# Patient Record
Sex: FEMALE | Race: BLACK OR AFRICAN AMERICAN | NOT HISPANIC OR LATINO | URBAN - METROPOLITAN AREA
[De-identification: names, ages, dates, MRNs, and addresses within clinical notes are randomized per-mention and may not be internally consistent; named-entity substitution may affect disease eponyms.]

---

## 2022-08-05 ENCOUNTER — APPOINTMENT (OUTPATIENT)
Dept: URGENT CARE | Age: 87
End: 2022-08-05

## 2023-06-14 PROBLEM — R79.89 SERUM CREATININE RAISED: Status: ACTIVE | Noted: 2021-11-11

## 2023-06-14 PROBLEM — F43.9 STRESS AT HOME: Status: ACTIVE | Noted: 2019-01-31

## 2023-06-14 PROBLEM — R06.83 SNORES: Status: ACTIVE | Noted: 2019-01-31

## 2023-06-14 PROBLEM — Z86.010 HISTORY OF ADENOMATOUS POLYP OF COLON: Status: ACTIVE | Noted: 2019-04-25

## 2023-06-14 PROBLEM — R79.89 ELEVATED PLATELET COUNT: Status: ACTIVE | Noted: 2021-11-11

## 2023-06-14 PROBLEM — R60.0 BILATERAL LOWER EXTREMITY EDEMA: Status: ACTIVE | Noted: 2019-01-31

## 2023-06-14 PROBLEM — Z86.010 HISTORY OF ADENOMATOUS POLYP OF COLON: Status: RESOLVED | Noted: 2019-04-25 | Resolved: 2023-06-14

## 2023-08-23 PROBLEM — I10 HYPERTENSION: Status: ACTIVE | Noted: 2023-08-23

## 2023-09-26 PROBLEM — N32.81 OAB (OVERACTIVE BLADDER): Status: ACTIVE | Noted: 2023-09-26

## 2024-06-10 ENCOUNTER — OFFICE VISIT (OUTPATIENT)
Dept: INTERNAL MEDICINE CLINIC | Facility: CLINIC | Age: 89
End: 2024-06-10

## 2024-06-10 VITALS
HEART RATE: 71 BPM | BODY MASS INDEX: 33.23 KG/M2 | OXYGEN SATURATION: 95 % | DIASTOLIC BLOOD PRESSURE: 71 MMHG | SYSTOLIC BLOOD PRESSURE: 132 MMHG | WEIGHT: 176 LBS | HEIGHT: 61 IN | TEMPERATURE: 98 F

## 2024-06-10 DIAGNOSIS — K21.00 GASTROESOPHAGEAL REFLUX DISEASE WITH ESOPHAGITIS WITHOUT HEMORRHAGE: ICD-10-CM

## 2024-06-10 DIAGNOSIS — I83.893 VARICOSE VEINS OF LEG WITH SWELLING, BILATERAL: Primary | ICD-10-CM

## 2024-06-10 DIAGNOSIS — I87.2 STASIS DERMATITIS: ICD-10-CM

## 2024-06-10 PROBLEM — R79.89 ELEVATED PLATELET COUNT: Status: ACTIVE | Noted: 2021-11-11

## 2024-06-10 PROBLEM — R43.8 BITTER TASTE: Status: ACTIVE | Noted: 2024-02-01

## 2024-06-10 PROBLEM — R06.83 SNORES: Status: ACTIVE | Noted: 2019-01-31

## 2024-06-10 PROBLEM — F43.9 STRESS AT HOME: Status: ACTIVE | Noted: 2019-01-31

## 2024-06-10 PROBLEM — R60.0 BILATERAL LOWER EXTREMITY EDEMA: Status: ACTIVE | Noted: 2019-01-31

## 2024-06-10 PROBLEM — N39.41 URGE INCONTINENCE: Status: ACTIVE | Noted: 2018-03-03

## 2024-06-10 PROBLEM — I10 HYPERTENSION: Status: ACTIVE | Noted: 2023-08-23

## 2024-06-10 PROBLEM — R79.89 SERUM CREATININE RAISED: Status: ACTIVE | Noted: 2021-11-11

## 2024-06-10 PROBLEM — K14.3 BROWN HAIRY TONGUE: Status: ACTIVE | Noted: 2024-02-01

## 2024-06-10 PROBLEM — N32.81 OAB (OVERACTIVE BLADDER): Status: ACTIVE | Noted: 2023-09-26

## 2024-06-10 PROCEDURE — 99204 OFFICE O/P NEW MOD 45 MIN: CPT | Performed by: INTERNAL MEDICINE

## 2024-06-10 RX ORDER — FUROSEMIDE 40 MG/1
20 TABLET ORAL DAILY
COMMUNITY
Start: 2023-05-26

## 2024-06-10 RX ORDER — ASPIRIN 81 MG/1
81 TABLET ORAL DAILY
COMMUNITY

## 2024-06-10 NOTE — PROGRESS NOTES
Subjective:   Patient ID: Ivis Pablo is a 88 year old female.    Swelling        History/Other: She came in today to establish care with new physician.  According to the patient she does have chronic edema on both legs and she is using compression socks her left is more than her right.  According to her she does have varicose veins and previous surgery the swelling progressively is getting worse.  She is on diuretics.  But is not helping she wants to see a vein specialist.    She also states that she does have darkening of the skin around the ankles wants to see a dermatologist  According to her she also has a very bitter taste in her mouth.  This is ongoing for a long time and she was seen by ENT at  [place] and according to her she was told that there is due to her age  Review of Systems   Constitutional: Negative.    HENT: Negative.     Respiratory: Negative.     Cardiovascular:  Positive for leg swelling.   Genitourinary: Negative.    Musculoskeletal: Negative.    Skin: Negative.    Neurological: Negative.    Hematological: Negative.    Psychiatric/Behavioral: Negative.       Current Outpatient Medications   Medication Sig Dispense Refill    furosemide 40 MG Oral Tab Take 0.5 tablets (20 mg total) by mouth daily.      aspirin 81 MG Oral Tab EC Take 1 tablet (81 mg total) by mouth daily.      naphazoline-pheniramine 0.025-0.3 % Ophthalmic Solution Apply 1 Application to eye As Directed.       Allergies:  Allergies   Allergen Reactions    Penicillins HIVES, ITCHING, OTHER (SEE COMMENTS) and RASH     itching in abdominal area    itching in abdominal area   itching in abdominal area      Other reaction(s): PRURITUS   itching in abdominal area    Erythromycin RASH       Objective:   Physical Exam  Constitutional:       Appearance: Normal appearance.   HENT:      Head: Normocephalic and atraumatic.   Cardiovascular:      Rate and Rhythm: Normal rate and regular rhythm.      Heart sounds: Normal heart sounds.    Pulmonary:      Effort: Pulmonary effort is normal.      Breath sounds: Normal breath sounds.   Abdominal:      Palpations: Abdomen is soft.   Musculoskeletal:         General: Swelling present.      Cervical back: Normal range of motion and neck supple.      Comments: 1+ edema   Skin:     General: Skin is warm.   Neurological:      Mental Status: She is alert. Mental status is at baseline.         Assessment & Plan:   1.   Bad mouth taste  check for H. pylori   2. Varicose veins of leg with swelling, bilateral I will order ultrasound venous insufficiency, continue with compression socks I will refer to see interventional radiologist   3. Stasis dermatitis referral for dermatology       Orders Placed This Encounter   Procedures    Helicobacter Pylori Breath Test, Adult       Meds This Visit:  Requested Prescriptions      No prescriptions requested or ordered in this encounter       Imaging & Referrals:  DERM - INTERNAL  OP REFERRAL TO INTERVENTIONAL RADIOLOGY  US VENOUS INSUFFICIENCY (REFLUX) BILAT LOWER EXT SH(CPT=93970)

## 2024-06-26 ENCOUNTER — OFFICE VISIT (OUTPATIENT)
Dept: INTERNAL MEDICINE CLINIC | Facility: CLINIC | Age: 89
End: 2024-06-26

## 2024-06-26 VITALS
SYSTOLIC BLOOD PRESSURE: 135 MMHG | WEIGHT: 177 LBS | DIASTOLIC BLOOD PRESSURE: 85 MMHG | OXYGEN SATURATION: 95 % | HEART RATE: 71 BPM | HEIGHT: 61 IN | BODY MASS INDEX: 33.42 KG/M2 | TEMPERATURE: 98 F

## 2024-06-26 DIAGNOSIS — K13.79 MOUTH SORES: Primary | ICD-10-CM

## 2024-06-26 PROCEDURE — 99213 OFFICE O/P EST LOW 20 MIN: CPT | Performed by: INTERNAL MEDICINE

## 2024-06-26 NOTE — PROGRESS NOTES
Subjective:     Patient ID: Ivis Pablo is a 88 year old female.    Cough        History/Other:   Today for follow-up from emergency room.  According to the patient she went to emergency room because she was having swelling on her lips her mouth was sore she was not able to eat anything.  In the ER they checked for strep throat was negative they did blood work and she could not wait she left.  She is here at the office with the same complaint she.  She has chronic coating of her tongue and pain.  She was seen by ENT 6 months ago      Review of Systems   Constitutional: Negative.    HENT: Negative.     Respiratory:  Negative for cough.    Cardiovascular: Negative.    Gastrointestinal: Negative.    Genitourinary: Negative.    Neurological: Negative.    Hematological: Negative.    Psychiatric/Behavioral: Negative.       Current Outpatient Medications   Medication Sig Dispense Refill    aspirin 81 MG Oral Tab EC Take 1 tablet (81 mg total) by mouth daily.      furosemide 40 MG Oral Tab Take 0.5 tablets (20 mg total) by mouth daily.      naphazoline-pheniramine 0.025-0.3 % Ophthalmic Solution Apply 1 Application to eye As Directed.       Allergies:  Allergies   Allergen Reactions    Penicillins HIVES, ITCHING, OTHER (SEE COMMENTS) and RASH     itching in abdominal area    itching in abdominal area   itching in abdominal area      Other reaction(s): PRURITUS   itching in abdominal area    Erythromycin RASH       Past Medical History:    Edema    Uterine cancer (HCC)      Past Surgical History:   Procedure Laterality Date    Cataract      Hysterectomy      Other surgical history  02/2024    botox procedure of bladder      Family History   Problem Relation Age of Onset    Other (tuberculosis) Father     Hypertension Mother     Alcohol abuse Mother     Hypertension Sister     Heart Attack Sister       Social History:   Social History     Socioeconomic History    Marital status: Unknown   Tobacco Use    Smoking status: Never      Passive exposure: Never    Smokeless tobacco: Never   Vaping Use    Vaping status: Never Used   Substance and Sexual Activity    Alcohol use: Not Currently    Drug use: Not Currently        Objective:   Physical Exam  Vitals and nursing note reviewed.   Constitutional:       Appearance: Normal appearance.   HENT:      Head: Normocephalic and atraumatic.      Comments: Coated tongue     Mouth/Throat:      Dentition: Gingival swelling present.   Cardiovascular:      Rate and Rhythm: Normal rate and regular rhythm.      Pulses: Normal pulses.      Heart sounds: Normal heart sounds.   Musculoskeletal:      Cervical back: Normal range of motion and neck supple.   Neurological:      Mental Status: She is alert.         Assessment & Plan:   No diagnosis found.  Oral mucosa lesion/swelling/I will prescribe her Magic wash and I did refer her to see ENT as soon as possible, if she is not able to eat and drink she needs to go to ER  No orders of the defined types were placed in this encounter.      Meds This Visit:  Requested Prescriptions      No prescriptions requested or ordered in this encounter       Imaging & Referrals:  None

## 2024-06-27 ENCOUNTER — TELEPHONE (OUTPATIENT)
Dept: INTERNAL MEDICINE CLINIC | Facility: CLINIC | Age: 89
End: 2024-06-27

## 2024-06-27 ENCOUNTER — OFFICE VISIT (OUTPATIENT)
Dept: OTOLARYNGOLOGY | Facility: CLINIC | Age: 89
End: 2024-06-27

## 2024-06-27 DIAGNOSIS — K13.70 ORAL MUCOSAL LESION: Primary | ICD-10-CM

## 2024-06-27 PROCEDURE — 99203 OFFICE O/P NEW LOW 30 MIN: CPT | Performed by: OTOLARYNGOLOGY

## 2024-06-27 RX ORDER — SULFAMETHOXAZOLE AND TRIMETHOPRIM 800; 160 MG/1; MG/1
1 TABLET ORAL 2 TIMES DAILY
COMMUNITY
Start: 2024-04-24

## 2024-06-27 RX ORDER — SOLIFENACIN SUCCINATE 10 MG/1
10 TABLET, FILM COATED ORAL DAILY
COMMUNITY
Start: 2024-03-13

## 2024-06-27 RX ORDER — SULFAMETHOXAZOLE AND TRIMETHOPRIM 800; 160 MG/1; MG/1
1 TABLET ORAL EVERY 12 HOURS
Qty: 14 TABLET | Refills: 0 | Status: SHIPPED | OUTPATIENT
Start: 2024-06-27

## 2024-06-27 RX ORDER — FESOTERODINE FUMARATE 4 MG/1
4 TABLET, FILM COATED, EXTENDED RELEASE ORAL DAILY
COMMUNITY

## 2024-06-27 RX ORDER — TOLTERODINE 4 MG/1
4 CAPSULE, EXTENDED RELEASE ORAL DAILY
COMMUNITY
Start: 2023-05-26

## 2024-06-27 RX ORDER — DIPHENHYDRAMINE HCL 12.5MG/5ML
LIQUID (ML) ORAL
Qty: 500 ML | Refills: 0 | Status: SHIPPED | OUTPATIENT
Start: 2024-06-27

## 2024-06-27 RX ORDER — OMEPRAZOLE 20 MG/1
CAPSULE, DELAYED RELEASE ORAL
COMMUNITY
Start: 2024-02-01

## 2024-06-27 RX ORDER — POTASSIUM CHLORIDE 750 MG/1
20 TABLET, FILM COATED, EXTENDED RELEASE ORAL DAILY
COMMUNITY
Start: 2022-06-06

## 2024-06-27 RX ORDER — PREDNISONE 10 MG/1
TABLET ORAL
Qty: 44 TABLET | Refills: 0 | Status: SHIPPED | OUTPATIENT
Start: 2024-06-27

## 2024-06-27 NOTE — PROGRESS NOTES
Ivis Pablo is a 88 year old female.    Chief Complaint   Patient presents with    Mouth/Lip Problem     Patient is here due to sores in mouth and discoloration in tongue  x 1 week         HISTORY OF PRESENT ILLNESS  States that she was seen by Dr. Dominguez 6 months ago at [facility] and diagnosed with oral thrush but given Biotene mouthwash to use with no medications.  Seen by another doctor in February at [facility] and told that she had thrush and started on an antithrush medication.  No improvement her symptoms.  Symptoms are wax and wane and more recently she has been having severe throat discomfort to the point where she has not been able to actually eat food or liquids very readily since 3 days.  Seen by Dr. Pacheco and sent to my office for my opinion regarding her chronic mouth discomfort.  She states that her mouth is full of ulcers and this is much worse than usual.  Prescribed some type of medication by her physician which was not available to her.  On no other meds at this time.  No other signs, symptoms or complaints      Social History     Socioeconomic History    Marital status: Unknown   Tobacco Use    Smoking status: Never     Passive exposure: Never    Smokeless tobacco: Never   Vaping Use    Vaping status: Never Used   Substance and Sexual Activity    Alcohol use: Not Currently    Drug use: Not Currently       Family History   Problem Relation Age of Onset    Other (tuberculosis) Father     Hypertension Mother     Alcohol abuse Mother     Hypertension Sister     Heart Attack Sister        Past Medical History:    Edema    Uterine cancer (HCC)       Past Surgical History:   Procedure Laterality Date    Cataract      Hysterectomy      Other surgical history  02/2024    botox procedure of bladder         REVIEW OF SYSTEMS    System Neg/Pos Details   Constitutional Negative Fatigue, fever and weight loss.   ENMT Negative Drooling.   Eyes Negative Blurred vision and vision  changes.   Respiratory Negative Dyspnea and wheezing.   Cardio Negative Chest pain, irregular heartbeat/palpitations and syncope.   GI Negative Abdominal pain and diarrhea.   Endocrine Negative Cold intolerance and heat intolerance.   Neuro Negative Tremors.   Psych Negative Anxiety and depression.   Integumentary Negative Frequent skin infections, pigment change and rash.   Hema/Lymph Negative Easy bleeding and easy bruising.           PHYSICAL EXAM    There were no vitals taken for this visit.       Constitutional Normal Overall appearance - Normal.   Psychiatric Normal Orientation - Oriented to time, place, person & situation. Appropriate mood and affect.   Neck Exam Normal Inspection - Normal. Palpation - Normal. Parotid gland - Normal. Thyroid gland - Normal.   Eyes Normal Conjunctiva - Right: Normal, Left: Normal. Pupil - Right: Normal, Left: Normal. Fundus - Right: Normal, Left: Normal.   Neurological Normal Memory - Normal. Cranial nerves - Cranial nerves II through XII grossly intact.   Head/Face Normal Facial features - Normal. Eyebrows - Normal. Skull - Normal.        Nasopharynx Normal External nose - Normal. Lips/teeth/gums - Normal. Tonsils - Normal. Oropharynx - Normal.   Ears Normal Inspection - Right: Normal, Left: Normal. Canal - Right: Normal, Left: Normal. TM - Right: Normal, Left: Normal.   Skin Normal Inspection - Normal.        Lymph Detail Normal Submental. Submandibular. Anterior cervical. Posterior cervical. Supraclavicular.        Nose/Mouth/Throat Normal External nose - Normal. Lips/teeth/gums - Normal. Tonsils - Normal. Oropharynx -significant erythema of the entire buccal palatal mucosa including the labial mucosa.  Erythema of the lateral aspects of the tongue bilaterally and ulcers noted along the buccal mucosa.   Nose/Mouth/Throat Normal Nares - Right: Normal Left: Normal. Septum -Normal  Turbinates - Right: Normal, Left: Normal.       Current Outpatient Medications:     fesoterodine  ER 4 MG Oral Tablet 24 Hr, Take 1 tablet (4 mg total) by mouth daily., Disp: , Rfl:     omeprazole 20 MG Oral Capsule Delayed Release, , Disp: , Rfl:     Potassium Chloride ER 10 MEQ Oral Tab CR, Take 2 tablets (20 mEq total) by mouth daily., Disp: , Rfl:     Solifenacin Succinate 10 MG Oral Tab, Take 1 tablet (10 mg total) by mouth daily., Disp: , Rfl:     sulfamethoxazole-trimethoprim -160 MG Oral Tab per tablet, Take 1 tablet by mouth 2 (two) times daily., Disp: , Rfl:     tolterodine ER 4 MG Oral Capsule SR 24 Hr, Take 1 capsule (4 mg total) by mouth daily., Disp: , Rfl:     predniSONE 10 MG Oral Tab, 6 tablets daily day one through 5, 4 tablets daily on days  6 and 7, 2 tablets daily on days 8 and 9, One tablet daily on days 10 and 11., Disp: 44 tablet, Rfl: 0    diphenhydrAMINE 12.5 MG/5ML Oral Elixir, Please crush 8 tablets of hydrocortisone 20 mg and mix in 500 cc of Benadryl elixir. 5 cc swish and swallow q.6 hours, Disp: 500 mL, Rfl: 0    sulfamethoxazole-trimethoprim -160 MG Oral Tab per tablet, Take 1 tablet by mouth every 12 (twelve) hours., Disp: 14 tablet, Rfl: 0    benadryl-lidocaine-mylanta 1:1:1 Oral Suspension, Take 5-10 mL by mouth 3 (three) times daily before meals. Swish and Smallow or Swish and Spit, Disp: 100 mL, Rfl: 0    aspirin 81 MG Oral Tab EC, Take 1 tablet (81 mg total) by mouth daily., Disp: , Rfl:     furosemide 40 MG Oral Tab, Take 0.5 tablets (20 mg total) by mouth daily., Disp: , Rfl:     naphazoline-pheniramine 0.025-0.3 % Ophthalmic Solution, Apply 1 Application to eye As Directed., Disp: , Rfl:   ASSESSMENT AND PLAN    1. Oral mucosal lesion  Unclear which she may be seen at this time.  Unlikely to be thrush as she never had any resolution with previous treatments.  We discussed the possibility that this may be some type of pemphigus or pemphigoid type of process.  I did ask her to start a prednisone burst and taper a mouthwash containing Benadryl and hydrocortisone  as well as's for secondary mucosal infection from oral bacteria.  Return to see me in 1 week for reevaluation.  I did discuss with her the fact that this is serious enough that she has has not eaten in about 3 days and if she finds that she cannot take her medications or food or more importantly taken liquids that she should present to the ED for possible admission.  She states understanding and agrees with this plan.        This note was prepared using Dragon [employer] voice recognition dictation software. As a result errors may occur. When identified these errors have been corrected. While every attempt is made to correct errors during dictation discrepancies may still exist    Leon Figueredo MD    6/27/2024    11:00 AM

## 2024-06-27 NOTE — TELEPHONE ENCOUNTER
Per pharmacy rep they did receive 3 prescriptions yesterday, and the compounded prescription cannot be done there.  That prescription will need to go to [facility] pharmacy on [address] in [place] phone # [phone].    Message left for patient to call us back need to relate information to her and ask if she wants to  the compounded prescription at the other [facility] pharmacy.

## 2024-06-27 NOTE — TELEPHONE ENCOUNTER
Patient went to the pharmacy and was told the script was not received.     Disp Refills Start End     benadryl-lidocaine-mylanta 1:1:1 Oral Suspension 100 mL 0 6/26/2024 --    Sig - Route: Take 5-10 mL by mouth 3 (three) times daily before meals. Swish and Smallow or Swish and Spit - Oral      Pharmacy  [facility]/PHARMACY #[id number] [place], [place] - [address] [place] [address]. [phone], [phone]

## 2024-07-01 ENCOUNTER — HOSPITAL ENCOUNTER (OUTPATIENT)
Dept: ULTRASOUND IMAGING | Facility: HOSPITAL | Age: 89
Discharge: HOME | End: 2024-07-01
Attending: INTERNAL MEDICINE
Payer: COMMERCIAL

## 2024-07-01 DIAGNOSIS — I83.893 VARICOSE VEINS OF LEG WITH SWELLING, BILATERAL: ICD-10-CM

## 2024-07-01 PROCEDURE — 93970 EXTREMITY STUDY: CPT | Performed by: INTERNAL MEDICINE

## 2024-07-05 ENCOUNTER — OFFICE VISIT (OUTPATIENT)
Dept: OTOLARYNGOLOGY | Facility: CLINIC | Age: 89
End: 2024-07-05

## 2024-07-05 DIAGNOSIS — K13.70 ORAL MUCOSAL LESION: Primary | ICD-10-CM

## 2024-07-05 PROCEDURE — 99213 OFFICE O/P EST LOW 20 MIN: CPT | Performed by: OTOLARYNGOLOGY

## 2024-07-05 RX ORDER — DIPHENHYDRAMINE HCL 12.5MG/5ML
LIQUID (ML) ORAL
Qty: 500 ML | Refills: 0 | Status: SHIPPED | OUTPATIENT
Start: 2024-07-05

## 2024-07-05 NOTE — PROGRESS NOTES
Ivis Pablo is a 88 year old female.    Chief Complaint   Patient presents with    Follow - Up     Oral lesion reevaluation        HISTORY OF PRESENT ILLNESS  States that she was seen by Dr. Dominguez 6 months ago at [facility] and diagnosed with oral thrush but given Biotene mouthwash to use with no medications.  Seen by another doctor in February at [facility] and told that she had thrush and started on an antithrush medication.  No improvement her symptoms.  Symptoms are wax and wane and more recently she has been having severe throat discomfort to the point where she has not been able to actually eat food or liquids very readily since 3 days.  Seen by Dr. Pacheco and sent to my office for my opinion regarding her chronic mouth discomfort.  She states that her mouth is full of ulcers and this is much worse than usual.  Prescribed some type of medication by her physician which was not available to her.  On no other meds at this time.  No other signs, symptoms or complaints     7/5/24 last visit started on prednisone burst and taper as well as hydrocortisone/Benadryl elixir.  Was not dispensed the elixir by her [facility] pharmacy.  She states that within 3 days she was able to eat and feels very good at this point feeling that she is essentially back to normal.  No complaints or concerns even feels that her tongue has improved in terms of its appearance on the surface.  No other signs, symptoms or complaints.  Etiology of her problem unclear at this time.      Social History     Socioeconomic History    Marital status: Unknown   Tobacco Use    Smoking status: Never     Passive exposure: Never    Smokeless tobacco: Never   Vaping Use    Vaping status: Never Used   Substance and Sexual Activity    Alcohol use: Not Currently    Drug use: Not Currently       Family History   Problem Relation Age of Onset    Other (tuberculosis) Father     Hypertension Mother     Alcohol abuse Mother     Hypertension  Sister     Heart Attack Sister        Past Medical History:    Edema    Uterine cancer (HCC)       Past Surgical History:   Procedure Laterality Date    Cataract      Hysterectomy      Other surgical history  02/2024    botox procedure of bladder         REVIEW OF SYSTEMS    System Neg/Pos Details   Constitutional Negative Fatigue, fever and weight loss.   ENMT Negative Drooling.   Eyes Negative Blurred vision and vision changes.   Respiratory Negative Dyspnea and wheezing.   Cardio Negative Chest pain, irregular heartbeat/palpitations and syncope.   GI Negative Abdominal pain and diarrhea.   Endocrine Negative Cold intolerance and heat intolerance.   Neuro Negative Tremors.   Psych Negative Anxiety and depression.   Integumentary Negative Frequent skin infections, pigment change and rash.   Hema/Lymph Negative Easy bleeding and easy bruising.           PHYSICAL EXAM    There were no vitals taken for this visit.       Constitutional Normal Overall appearance - Normal.   Psychiatric Normal Orientation - Oriented to time, place, person & situation. Appropriate mood and affect.   Neck Exam Normal Inspection - Normal. Palpation - Normal. Parotid gland - Normal. Thyroid gland - Normal.   Eyes Normal Conjunctiva - Right: Normal, Left: Normal. Pupil - Right: Normal, Left: Normal. Fundus - Right: Normal, Left: Normal.   Neurological Normal Memory - Normal. Cranial nerves - Cranial nerves II through XII grossly intact.   Head/Face Normal Facial features - Normal. Eyebrows - Normal. Skull - Normal.        Nasopharynx Normal External nose - Normal. Lips/teeth/gums - Normal. Tonsils - Normal. Oropharynx - Normal.   Ears Normal Inspection - Right: Normal, Left: Normal. Canal - Right: Normal, Left: Normal. TM - Right: Normal, Left: Normal.   Skin Normal Inspection - Normal.        Lymph Detail Normal Submental. Submandibular. Anterior cervical. Posterior cervical. Supraclavicular.        Nose/Mouth/Throat Normal External nose -  Normal. Lips/teeth/gums - Normal. Tonsils - Normal. Oropharynx - Normal.   Nose/Mouth/Throat Normal Nares - Right: Normal Left: Normal. Septum -Normal  Turbinates - Right: Normal, Left: Normal.       Current Outpatient Medications:     diphenhydrAMINE 12.5 MG/5ML Oral Elixir, Please crush 8 tablets of hydrocortisone 20 mg and mix in 500 cc of Benadryl elixir. 5 cc swish and swallow q.6 hours, Disp: 500 mL, Rfl: 0    fesoterodine ER 4 MG Oral Tablet 24 Hr, Take 1 tablet (4 mg total) by mouth daily., Disp: , Rfl:     omeprazole 20 MG Oral Capsule Delayed Release, , Disp: , Rfl:     Potassium Chloride ER 10 MEQ Oral Tab CR, Take 2 tablets (20 mEq total) by mouth daily., Disp: , Rfl:     Solifenacin Succinate 10 MG Oral Tab, Take 1 tablet (10 mg total) by mouth daily., Disp: , Rfl:     sulfamethoxazole-trimethoprim -160 MG Oral Tab per tablet, Take 1 tablet by mouth 2 (two) times daily., Disp: , Rfl:     tolterodine ER 4 MG Oral Capsule SR 24 Hr, Take 1 capsule (4 mg total) by mouth daily., Disp: , Rfl:     predniSONE 10 MG Oral Tab, 6 tablets daily day one through 5, 4 tablets daily on days  6 and 7, 2 tablets daily on days 8 and 9, One tablet daily on days 10 and 11., Disp: 44 tablet, Rfl: 0    sulfamethoxazole-trimethoprim -160 MG Oral Tab per tablet, Take 1 tablet by mouth every 12 (twelve) hours., Disp: 14 tablet, Rfl: 0    benadryl-lidocaine-mylanta 1:1:1 Oral Suspension, Take 5-10 mL by mouth 3 (three) times daily before meals. Swish and Smallow or Swish and Spit, Disp: 100 mL, Rfl: 0    aspirin 81 MG Oral Tab EC, Take 1 tablet (81 mg total) by mouth daily., Disp: , Rfl:     furosemide 40 MG Oral Tab, Take 0.5 tablets (20 mg total) by mouth daily., Disp: , Rfl:     naphazoline-pheniramine 0.025-0.3 % Ophthalmic Solution, Apply 1 Application to eye As Directed., Disp: , Rfl:   ASSESSMENT AND PLAN    1. Oral mucosal lesion  Complete resolution of her oral mucosal lesions.  Even some improvement in her  tongue surface.  I have asked her to look into getting the mouthwash containing Benadryl and hydrocortisone and to return to see me as needed but she is now eating and feeling essentially back to normal again.        This note was prepared using Dragon [employer] voice recognition dictation software. As a result errors may occur. When identified these errors have been corrected. While every attempt is made to correct errors during dictation discrepancies may still exist    Leon Figueredo MD    7/5/2024    11:07 AM

## 2024-07-08 DIAGNOSIS — M79.89 LEFT LEG SWELLING: Primary | ICD-10-CM

## 2024-07-09 ENCOUNTER — TELEPHONE (OUTPATIENT)
Dept: OTOLARYNGOLOGY | Facility: CLINIC | Age: 89
End: 2024-07-09

## 2024-07-09 NOTE — TELEPHONE ENCOUNTER
Dr. Figueredo, Patient does not feel comfortable crushing the hydrocortisone pills and putting them in the Benadryl elixir.  Pharmacy is asking for another alternative.  They are not a compounding pharmacy. She's been trying to get this med since 7/5/24. Please advise.

## 2024-07-09 NOTE — TELEPHONE ENCOUNTER
LVM for pharmacist, informing him/her that if the patient or caregiver feels competent enough to crush the pills and then add them to the Benadryl elixir, then patient can get the meds separately and do that.  If they feel they are not competent enough to do that, then the pharmacy needs to call our office and we will see if Dr. Figueredo has another option.

## 2024-07-09 NOTE — TELEPHONE ENCOUNTER
[facility]/pharm calling to speak with nurse, patient does not feel comfortable crushing her own pills. Please call a [phone],thanks.  *see closed telephone encounter from today

## 2024-07-09 NOTE — TELEPHONE ENCOUNTER
[facility] pharmacy calling because they do not compound at their store. They state they can not crush the 8 tablets of hydrocortisone and mix with the Benadryl. They are asking if they should give these medications to the patient separately, or if something else should be prescribed. She states patient has been trying to  medication since 7/5. Please call.       Current Outpatient Medications:     diphenhydrAMINE 12.5 MG/5ML Oral Elixir, Please crush 8 tablets of hydrocortisone 20 mg and mix in 500 cc of Benadryl elixir. 5 cc swish and swallow q.6 hours, Disp: 500 mL, Rfl: 0

## 2024-07-10 RX ORDER — DIPHENHYDRAMINE HCL 12.5MG/5ML
LIQUID (ML) ORAL
Qty: 500 ML | Refills: 0 | Status: SHIPPED | OUTPATIENT
Start: 2024-07-10

## 2024-07-10 NOTE — TELEPHONE ENCOUNTER
Patient is calling back to ask what type of alternative medication she should take. Please call.

## 2024-07-10 NOTE — TELEPHONE ENCOUNTER
[facility]  [facility]  [address], [place] [address] · [address]  [phone]  Rx sent to [facility] pharmacy and pt informed.  Confirmed [facility] pharmacy does compound medications.

## 2024-07-23 ENCOUNTER — OFFICE VISIT (OUTPATIENT)
Dept: OTOLARYNGOLOGY | Facility: CLINIC | Age: 89
End: 2024-07-23

## 2024-07-23 DIAGNOSIS — K13.70 ORAL MUCOSAL LESION: ICD-10-CM

## 2024-07-23 DIAGNOSIS — R13.10 DYSPHAGIA, UNSPECIFIED TYPE: Primary | ICD-10-CM

## 2024-07-23 PROCEDURE — 99213 OFFICE O/P EST LOW 20 MIN: CPT | Performed by: OTOLARYNGOLOGY

## 2024-07-23 PROCEDURE — 31575 DIAGNOSTIC LARYNGOSCOPY: CPT | Performed by: OTOLARYNGOLOGY

## 2024-07-23 NOTE — H&P (VIEW-ONLY)
Ivis Pablo is a 88 year old female.    Chief Complaint   Patient presents with    Follow - Up     Patient is here due to oral lesion follow up.     Throat Problem     Patient reports issues swallowing.        HISTORY OF PRESENT ILLNESS  States that she was seen by Dr. Dominguez 6 months ago at [facility] and diagnosed with oral thrush but given Biotene mouthwash to use with no medications.  Seen by another doctor in February at [facility] and told that she had thrush and started on an antithrush medication.  No improvement her symptoms.  Symptoms are wax and wane and more recently she has been having severe throat discomfort to the point where she has not been able to actually eat food or liquids very readily since 3 days.  Seen by Dr. Pacheco and sent to my office for my opinion regarding her chronic mouth discomfort.  She states that her mouth is full of ulcers and this is much worse than usual.  Prescribed some type of medication by her physician which was not available to her.  On no other meds at this time.  No other signs, symptoms or complaints     7/5/24 last visit started on prednisone burst and taper as well as hydrocortisone/Benadryl elixir.  Was not dispensed the elixir by her [facility] pharmacy.  She states that within 3 days she was able to eat and feels very good at this point feeling that she is essentially back to normal.  No complaints or concerns even feels that her tongue has improved in terms of its appearance on the surface.  No other signs, symptoms or complaints.  Etiology of her problem unclear at this time.     7/23/24 she has been using diphenhydramine/hydrocortisone for her oral lesions and feels that they have all resolved at this time.  She states that she was doing very well up until last week when she awoke and suddenly noted that she could not frantic and started trying to pass warm liquids which she states tends to open things up and allow her to swallow a little bit  better.  Has a hard time swallowing hard foods and even liquids seem to get hung up at times.  No other signs, symptoms or complaints she seems to be able to tolerate her own saliva.      Social History     Socioeconomic History    Marital status: Unknown   Tobacco Use    Smoking status: Never     Passive exposure: Never    Smokeless tobacco: Never   Vaping Use    Vaping status: Never Used   Substance and Sexual Activity    Alcohol use: Not Currently    Drug use: Not Currently       Family History   Problem Relation Age of Onset    Other (tuberculosis) Father     Hypertension Mother     Alcohol abuse Mother     Hypertension Sister     Heart Attack Sister        Past Medical History:    Edema    Uterine cancer (HCC)       Past Surgical History:   Procedure Laterality Date    Cataract      Hysterectomy      Other surgical history  02/2024    botox procedure of bladder         REVIEW OF SYSTEMS    System Neg/Pos Details   Constitutional Negative Fatigue, fever and weight loss.   ENMT Negative Drooling.   Eyes Negative Blurred vision and vision changes.   Respiratory Negative Dyspnea and wheezing.   Cardio Negative Chest pain, irregular heartbeat/palpitations and syncope.   GI Negative Abdominal pain and diarrhea.   Endocrine Negative Cold intolerance and heat intolerance.   Neuro Negative Tremors.   Psych Negative Anxiety and depression.   Integumentary Negative Frequent skin infections, pigment change and rash.   Hema/Lymph Negative Easy bleeding and easy bruising.           PHYSICAL EXAM    There were no vitals taken for this visit.       Constitutional Normal Overall appearance - Normal.   Psychiatric Normal Orientation - Oriented to time, place, person & situation. Appropriate mood and affect.   Neck Exam Normal Inspection - Normal. Palpation - Normal. Parotid gland - Normal. Thyroid gland - Normal.   Eyes Normal Conjunctiva - Right: Normal, Left: Normal. Pupil - Right: Normal, Left: Normal. Fundus - Right: Normal,  Left: Normal.   Neurological Normal Memory - Normal. Cranial nerves - Cranial nerves II through XII grossly intact.   Head/Face Normal Facial features - Normal. Eyebrows - Normal. Skull - Normal.        Nasopharynx Normal External nose - Normal. Lips/teeth/gums - Normal. Tonsils - Normal. Oropharynx - Normal.   Ears Normal Inspection - Right: Normal, Left: Normal. Canal - Right: Normal, Left: Normal. TM - Right: Normal, Left: Normal.   Skin Normal Inspection - Normal.        Lymph Detail Normal Submental. Submandibular. Anterior cervical. Posterior cervical. Supraclavicular.        Nose/Mouth/Throat Normal External nose - Normal. Lips/teeth/gums - Normal. Tonsils - Normal. Oropharynx - Normal.   Nose/Mouth/Throat Normal Nares - Right: Normal Left: Normal. Septum -Normal  Turbinates - Right: Normal, Left: Normal.   Procedures:  Endoscopy/Laryngoscopy  Pre-Procedure Care: Verbal consent was obtained. Procedure/risks were explained. Questions were answered. Correct patient identified. Correct side and site confirmed.      A topical spray of ).25% Neosynephrine was sprayed into the nose.    Laryngoscopy:  Flexible Fiberoptic Laryngoscopy: A diagnostic flexible fiberoptic laryngoscopy was performed. The flexible fiberoptic laryngoscope was placed into the nose or mouthand advanced  into the interior of the larynx. A thorough examination of the interior of the larynx was performed.   Findings were as follows.       Hypopharynx/Larynx:  Epiglottis is normal.  Arytenoids:  Bilateral: Arytenoids are normal.  Vocal folds-false  Bilateral: Vocal folds (false) are normal.  Vocal folds-true  Bilateral: Vocal folds (true) are normal.  Pyriform sinus:  Bilateral: Pyriform sinuses are normal.  Base of tongue is normal in appearance.  There is no airway obstruction.   General comments: Completely normal examination no lesions masses polyps nodules or other abnormalities.              Current Outpatient Medications:      diphenhydrAMINE 12.5 MG/5ML Oral Elixir, Please crush 8 tablets of hydrocortisone 20 mg and mix in 500 cc of Benadryl elixir. 5 cc swish and swallow q.6 hours, Disp: 500 mL, Rfl: 0    fesoterodine ER 4 MG Oral Tablet 24 Hr, Take 1 tablet (4 mg total) by mouth daily., Disp: , Rfl:     omeprazole 20 MG Oral Capsule Delayed Release, , Disp: , Rfl:     Potassium Chloride ER 10 MEQ Oral Tab CR, Take 2 tablets (20 mEq total) by mouth daily., Disp: , Rfl:     Solifenacin Succinate 10 MG Oral Tab, Take 1 tablet (10 mg total) by mouth daily., Disp: , Rfl:     sulfamethoxazole-trimethoprim -160 MG Oral Tab per tablet, Take 1 tablet by mouth 2 (two) times daily., Disp: , Rfl:     tolterodine ER 4 MG Oral Capsule SR 24 Hr, Take 1 capsule (4 mg total) by mouth daily., Disp: , Rfl:     predniSONE 10 MG Oral Tab, 6 tablets daily day one through 5, 4 tablets daily on days  6 and 7, 2 tablets daily on days 8 and 9, One tablet daily on days 10 and 11., Disp: 44 tablet, Rfl: 0    sulfamethoxazole-trimethoprim -160 MG Oral Tab per tablet, Take 1 tablet by mouth every 12 (twelve) hours., Disp: 14 tablet, Rfl: 0    aspirin 81 MG Oral Tab EC, Take 1 tablet (81 mg total) by mouth daily., Disp: , Rfl:     furosemide 40 MG Oral Tab, Take 0.5 tablets (20 mg total) by mouth daily., Disp: , Rfl:     naphazoline-pheniramine 0.025-0.3 % Ophthalmic Solution, Apply 1 Application to eye As Directed., Disp: , Rfl:   ASSESSMENT AND PLAN    1. Dysphagia, unspecified type  - XR VIDEO SWALLOW (CPT=74230); Future  - XR UGI/ESOPHAGUS DOUBLE CONTRAST (CPT=74246); Future    2. Oral mucosal lesion  Complete resolution of her oral mucosal lesions with steroids and Benadryl/hydrocortisone elixir.  Unfortunately about a week ago she started developing severe dysphagia initially felt like she could not swallow anything at all and now feels that she is able to swallow but with significant difficulty.  Feels that the food just has not moved.  I have  recommended a video swallow as well as an esophagram to rule out some type of esophageal  abnormality.  I will call her with the results of her studies        This note was prepared using Dragon [employer] voice recognition dictation software. As a result errors may occur. When identified these errors have been corrected. While every attempt is made to correct errors during dictation discrepancies may still exist    Leon Figueredo MD    7/23/2024    2:26 PM

## 2024-07-23 NOTE — PROGRESS NOTES
Ivis Pablo is a 88 year old female.    Chief Complaint   Patient presents with    Follow - Up     Patient is here due to oral lesion follow up.     Throat Problem     Patient reports issues swallowing.        HISTORY OF PRESENT ILLNESS  States that she was seen by Dr. Dominguez 6 months ago at [facility] and diagnosed with oral thrush but given Biotene mouthwash to use with no medications.  Seen by another doctor in February at [facility] and told that she had thrush and started on an antithrush medication.  No improvement her symptoms.  Symptoms are wax and wane and more recently she has been having severe throat discomfort to the point where she has not been able to actually eat food or liquids very readily since 3 days.  Seen by Dr. Pacheco and sent to my office for my opinion regarding her chronic mouth discomfort.  She states that her mouth is full of ulcers and this is much worse than usual.  Prescribed some type of medication by her physician which was not available to her.  On no other meds at this time.  No other signs, symptoms or complaints     7/5/24 last visit started on prednisone burst and taper as well as hydrocortisone/Benadryl elixir.  Was not dispensed the elixir by her [facility] pharmacy.  She states that within 3 days she was able to eat and feels very good at this point feeling that she is essentially back to normal.  No complaints or concerns even feels that her tongue has improved in terms of its appearance on the surface.  No other signs, symptoms or complaints.  Etiology of her problem unclear at this time.     7/23/24 she has been using diphenhydramine/hydrocortisone for her oral lesions and feels that they have all resolved at this time.  She states that she was doing very well up until last week when she awoke and suddenly noted that she could not frantic and started trying to pass warm liquids which she states tends to open things up and allow her to swallow a little bit  better.  Has a hard time swallowing hard foods and even liquids seem to get hung up at times.  No other signs, symptoms or complaints she seems to be able to tolerate her own saliva.      Social History     Socioeconomic History    Marital status: Unknown   Tobacco Use    Smoking status: Never     Passive exposure: Never    Smokeless tobacco: Never   Vaping Use    Vaping status: Never Used   Substance and Sexual Activity    Alcohol use: Not Currently    Drug use: Not Currently       Family History   Problem Relation Age of Onset    Other (tuberculosis) Father     Hypertension Mother     Alcohol abuse Mother     Hypertension Sister     Heart Attack Sister        Past Medical History:    Edema    Uterine cancer (HCC)       Past Surgical History:   Procedure Laterality Date    Cataract      Hysterectomy      Other surgical history  02/2024    botox procedure of bladder         REVIEW OF SYSTEMS    System Neg/Pos Details   Constitutional Negative Fatigue, fever and weight loss.   ENMT Negative Drooling.   Eyes Negative Blurred vision and vision changes.   Respiratory Negative Dyspnea and wheezing.   Cardio Negative Chest pain, irregular heartbeat/palpitations and syncope.   GI Negative Abdominal pain and diarrhea.   Endocrine Negative Cold intolerance and heat intolerance.   Neuro Negative Tremors.   Psych Negative Anxiety and depression.   Integumentary Negative Frequent skin infections, pigment change and rash.   Hema/Lymph Negative Easy bleeding and easy bruising.           PHYSICAL EXAM    There were no vitals taken for this visit.       Constitutional Normal Overall appearance - Normal.   Psychiatric Normal Orientation - Oriented to time, place, person & situation. Appropriate mood and affect.   Neck Exam Normal Inspection - Normal. Palpation - Normal. Parotid gland - Normal. Thyroid gland - Normal.   Eyes Normal Conjunctiva - Right: Normal, Left: Normal. Pupil - Right: Normal, Left: Normal. Fundus - Right: Normal,  Left: Normal.   Neurological Normal Memory - Normal. Cranial nerves - Cranial nerves II through XII grossly intact.   Head/Face Normal Facial features - Normal. Eyebrows - Normal. Skull - Normal.        Nasopharynx Normal External nose - Normal. Lips/teeth/gums - Normal. Tonsils - Normal. Oropharynx - Normal.   Ears Normal Inspection - Right: Normal, Left: Normal. Canal - Right: Normal, Left: Normal. TM - Right: Normal, Left: Normal.   Skin Normal Inspection - Normal.        Lymph Detail Normal Submental. Submandibular. Anterior cervical. Posterior cervical. Supraclavicular.        Nose/Mouth/Throat Normal External nose - Normal. Lips/teeth/gums - Normal. Tonsils - Normal. Oropharynx - Normal.   Nose/Mouth/Throat Normal Nares - Right: Normal Left: Normal. Septum -Normal  Turbinates - Right: Normal, Left: Normal.   Procedures:  Endoscopy/Laryngoscopy  Pre-Procedure Care: Verbal consent was obtained. Procedure/risks were explained. Questions were answered. Correct patient identified. Correct side and site confirmed.      A topical spray of ).25% Neosynephrine was sprayed into the nose.    Laryngoscopy:  Flexible Fiberoptic Laryngoscopy: A diagnostic flexible fiberoptic laryngoscopy was performed. The flexible fiberoptic laryngoscope was placed into the nose or mouthand advanced  into the interior of the larynx. A thorough examination of the interior of the larynx was performed.   Findings were as follows.       Hypopharynx/Larynx:  Epiglottis is normal.  Arytenoids:  Bilateral: Arytenoids are normal.  Vocal folds-false  Bilateral: Vocal folds (false) are normal.  Vocal folds-true  Bilateral: Vocal folds (true) are normal.  Pyriform sinus:  Bilateral: Pyriform sinuses are normal.  Base of tongue is normal in appearance.  There is no airway obstruction.   General comments: Completely normal examination no lesions masses polyps nodules or other abnormalities.              Current Outpatient Medications:      diphenhydrAMINE 12.5 MG/5ML Oral Elixir, Please crush 8 tablets of hydrocortisone 20 mg and mix in 500 cc of Benadryl elixir. 5 cc swish and swallow q.6 hours, Disp: 500 mL, Rfl: 0    fesoterodine ER 4 MG Oral Tablet 24 Hr, Take 1 tablet (4 mg total) by mouth daily., Disp: , Rfl:     omeprazole 20 MG Oral Capsule Delayed Release, , Disp: , Rfl:     Potassium Chloride ER 10 MEQ Oral Tab CR, Take 2 tablets (20 mEq total) by mouth daily., Disp: , Rfl:     Solifenacin Succinate 10 MG Oral Tab, Take 1 tablet (10 mg total) by mouth daily., Disp: , Rfl:     sulfamethoxazole-trimethoprim -160 MG Oral Tab per tablet, Take 1 tablet by mouth 2 (two) times daily., Disp: , Rfl:     tolterodine ER 4 MG Oral Capsule SR 24 Hr, Take 1 capsule (4 mg total) by mouth daily., Disp: , Rfl:     predniSONE 10 MG Oral Tab, 6 tablets daily day one through 5, 4 tablets daily on days  6 and 7, 2 tablets daily on days 8 and 9, One tablet daily on days 10 and 11., Disp: 44 tablet, Rfl: 0    sulfamethoxazole-trimethoprim -160 MG Oral Tab per tablet, Take 1 tablet by mouth every 12 (twelve) hours., Disp: 14 tablet, Rfl: 0    aspirin 81 MG Oral Tab EC, Take 1 tablet (81 mg total) by mouth daily., Disp: , Rfl:     furosemide 40 MG Oral Tab, Take 0.5 tablets (20 mg total) by mouth daily., Disp: , Rfl:     naphazoline-pheniramine 0.025-0.3 % Ophthalmic Solution, Apply 1 Application to eye As Directed., Disp: , Rfl:   ASSESSMENT AND PLAN    1. Dysphagia, unspecified type  - XR VIDEO SWALLOW (CPT=74230); Future  - XR UGI/ESOPHAGUS DOUBLE CONTRAST (CPT=74246); Future    2. Oral mucosal lesion  Complete resolution of her oral mucosal lesions with steroids and Benadryl/hydrocortisone elixir.  Unfortunately about a week ago she started developing severe dysphagia initially felt like she could not swallow anything at all and now feels that she is able to swallow but with significant difficulty.  Feels that the food just has not moved.  I have  recommended a video swallow as well as an esophagram to rule out some type of esophageal  abnormality.  I will call her with the results of her studies        This note was prepared using Dragon [employer] voice recognition dictation software. As a result errors may occur. When identified these errors have been corrected. While every attempt is made to correct errors during dictation discrepancies may still exist    Leon Figueredo MD    7/23/2024    2:26 PM

## 2024-07-24 ENCOUNTER — HOSPITAL ENCOUNTER (OUTPATIENT)
Dept: CT IMAGING | Facility: HOSPITAL | Age: 89
Discharge: HOME | End: 2024-07-24
Attending: STUDENT IN AN ORGANIZED HEALTH CARE EDUCATION/TRAINING PROGRAM
Payer: COMMERCIAL

## 2024-07-24 DIAGNOSIS — M79.89 LEFT LEG SWELLING: ICD-10-CM

## 2024-07-24 LAB
CREAT BLD-MCNC: 0.9 MG/DL
EGFRCR SERPLBLD CKD-EPI 2021: 62 ML/MIN/1.73M2 (ref 60–?)

## 2024-07-24 PROCEDURE — 82565 ASSAY OF CREATININE: CPT

## 2024-07-24 PROCEDURE — 74177 CT ABD & PELVIS W/CONTRAST: CPT | Performed by: STUDENT IN AN ORGANIZED HEALTH CARE EDUCATION/TRAINING PROGRAM

## 2024-07-25 ENCOUNTER — TELEPHONE (OUTPATIENT)
Dept: INTERVENTIONAL RADIOLOGY/VASCULAR | Facility: HOSPITAL | Age: 89
End: 2024-07-25

## 2024-07-25 DIAGNOSIS — I87.1 MAY-THURNER SYNDROME: Primary | ICD-10-CM

## 2024-07-25 NOTE — PROGRESS NOTES
Interventional Radiology  Progress Note    I called patient with results of her CT venogram. She has prominent left pelvic veins and left thigh venous varicosities. 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    I discussed this at length with Ms. Pablo. I recommend left iliac venography and stenting as appropriate. We discussed benefits and risks. She would like to proceed.    IR will call the patient to schedule.    Andi Morris MD  7/25/2024  Interventional Radiology  [facility]

## 2024-07-30 ENCOUNTER — TELEPHONE (OUTPATIENT)
Dept: CASE MANAGEMENT | Age: 89
End: 2024-07-30

## 2024-07-30 NOTE — TELEPHONE ENCOUNTER
CT Venography Abdomen/Pelvis        Status: DENIED        Reference number [id number]     A copy of the denial letter is filed under the MEDIA tab, reference for complete details. You may reach out to Rivera at [phone] to discuss decision.     Patient completed test prior to authorization.      Thank you

## 2024-08-06 ENCOUNTER — LAB ENCOUNTER (OUTPATIENT)
Dept: LAB | Facility: HOSPITAL | Age: 89
End: 2024-08-06
Attending: STUDENT IN AN ORGANIZED HEALTH CARE EDUCATION/TRAINING PROGRAM
Payer: COMMERCIAL

## 2024-08-06 DIAGNOSIS — Z01.818 PREOP TESTING: ICD-10-CM

## 2024-08-06 LAB
ANION GAP SERPL CALC-SCNC: 4 MMOL/L (ref 0–18)
BUN BLD-MCNC: 8 MG/DL (ref 9–23)
BUN/CREAT SERPL: 10.1 (ref 10–20)
CALCIUM BLD-MCNC: 9.3 MG/DL (ref 8.7–10.4)
CHLORIDE SERPL-SCNC: 110 MMOL/L (ref 98–112)
CO2 SERPL-SCNC: 30 MMOL/L (ref 21–32)
CREAT BLD-MCNC: 0.79 MG/DL
DEPRECATED RDW RBC AUTO: 48.1 FL (ref 35.1–46.3)
EGFRCR SERPLBLD CKD-EPI 2021: 72 ML/MIN/1.73M2 (ref 60–?)
ERYTHROCYTE [DISTWIDTH] IN BLOOD BY AUTOMATED COUNT: 15.4 % (ref 11–15)
FASTING STATUS PATIENT QL REPORTED: NO
GLUCOSE BLD-MCNC: 86 MG/DL (ref 70–99)
HCT VFR BLD AUTO: 37.9 %
HGB BLD-MCNC: 12.7 G/DL
MCH RBC QN AUTO: 28.7 PG (ref 26–34)
MCHC RBC AUTO-ENTMCNC: 33.5 G/DL (ref 31–37)
MCV RBC AUTO: 85.7 FL
OSMOLALITY SERPL CALC.SUM OF ELEC: 296 MOSM/KG (ref 275–295)
PLATELET # BLD AUTO: 650 10(3)UL (ref 150–450)
POTASSIUM SERPL-SCNC: 4.2 MMOL/L (ref 3.5–5.1)
RBC # BLD AUTO: 4.42 X10(6)UL
SODIUM SERPL-SCNC: 144 MMOL/L (ref 136–145)
WBC # BLD AUTO: 6.2 X10(3) UL (ref 4–11)

## 2024-08-06 PROCEDURE — 85027 COMPLETE CBC AUTOMATED: CPT

## 2024-08-06 PROCEDURE — 80048 BASIC METABOLIC PNL TOTAL CA: CPT

## 2024-08-06 PROCEDURE — 36415 COLL VENOUS BLD VENIPUNCTURE: CPT

## 2024-08-07 ENCOUNTER — HOSPITAL ENCOUNTER (OUTPATIENT)
Dept: INTERVENTIONAL RADIOLOGY/VASCULAR | Facility: HOSPITAL | Age: 89
Discharge: HOME | End: 2024-08-07
Attending: STUDENT IN AN ORGANIZED HEALTH CARE EDUCATION/TRAINING PROGRAM | Admitting: STUDENT IN AN ORGANIZED HEALTH CARE EDUCATION/TRAINING PROGRAM
Payer: COMMERCIAL

## 2024-08-07 VITALS
TEMPERATURE: 97 F | HEART RATE: 72 BPM | RESPIRATION RATE: 19 BRPM | HEIGHT: 61 IN | OXYGEN SATURATION: 97 % | DIASTOLIC BLOOD PRESSURE: 79 MMHG | BODY MASS INDEX: 33.42 KG/M2 | SYSTOLIC BLOOD PRESSURE: 162 MMHG | WEIGHT: 177 LBS

## 2024-08-07 DIAGNOSIS — Z01.818 PREOP TESTING: Primary | ICD-10-CM

## 2024-08-07 DIAGNOSIS — I87.1 MAY-THURNER SYNDROME: ICD-10-CM

## 2024-08-07 PROCEDURE — 067D3DZ DILATION OF LEFT COMMON ILIAC VEIN WITH INTRALUMINAL DEVICE, PERCUTANEOUS APPROACH: ICD-10-PCS | Performed by: STUDENT IN AN ORGANIZED HEALTH CARE EDUCATION/TRAINING PROGRAM

## 2024-08-07 PROCEDURE — 99153 MOD SED SAME PHYS/QHP EA: CPT | Performed by: STUDENT IN AN ORGANIZED HEALTH CARE EDUCATION/TRAINING PROGRAM

## 2024-08-07 PROCEDURE — B54CZZ3 ULTRASONOGRAPHY OF LEFT LOWER EXTREMITY VEINS, INTRAVASCULAR: ICD-10-PCS | Performed by: STUDENT IN AN ORGANIZED HEALTH CARE EDUCATION/TRAINING PROGRAM

## 2024-08-07 PROCEDURE — 99152 MOD SED SAME PHYS/QHP 5/>YRS: CPT | Performed by: STUDENT IN AN ORGANIZED HEALTH CARE EDUCATION/TRAINING PROGRAM

## 2024-08-07 PROCEDURE — 36010 PLACE CATHETER IN VEIN: CPT | Performed by: STUDENT IN AN ORGANIZED HEALTH CARE EDUCATION/TRAINING PROGRAM

## 2024-08-07 PROCEDURE — 37252 INTRVASC US NONCORONARY 1ST: CPT | Performed by: STUDENT IN AN ORGANIZED HEALTH CARE EDUCATION/TRAINING PROGRAM

## 2024-08-07 PROCEDURE — 3E043KZ INTRODUCTION OF OTHER DIAGNOSTIC SUBSTANCE INTO CENTRAL VEIN, PERCUTANEOUS APPROACH: ICD-10-PCS | Performed by: STUDENT IN AN ORGANIZED HEALTH CARE EDUCATION/TRAINING PROGRAM

## 2024-08-07 PROCEDURE — 37238 OPEN/PERQ PLACE STENT SAME: CPT | Performed by: STUDENT IN AN ORGANIZED HEALTH CARE EDUCATION/TRAINING PROGRAM

## 2024-08-07 RX ORDER — SODIUM CHLORIDE 9 MG/ML
INJECTION, SOLUTION INTRAVENOUS CONTINUOUS
Status: DISCONTINUED | OUTPATIENT
Start: 2024-08-07 | End: 2024-08-07

## 2024-08-07 RX ORDER — HEPARIN SODIUM 1000 [USP'U]/ML
INJECTION, SOLUTION INTRAVENOUS; SUBCUTANEOUS
Status: DISCONTINUED
Start: 2024-08-07 | End: 2024-08-07 | Stop reason: WASHOUT

## 2024-08-07 RX ORDER — MIDAZOLAM HYDROCHLORIDE 1 MG/ML
INJECTION INTRAMUSCULAR; INTRAVENOUS
Status: COMPLETED
Start: 2024-08-07 | End: 2024-08-07

## 2024-08-07 RX ORDER — LIDOCAINE HYDROCHLORIDE 20 MG/ML
INJECTION, SOLUTION EPIDURAL; INFILTRATION; INTRACAUDAL; PERINEURAL
Status: COMPLETED
Start: 2024-08-07 | End: 2024-08-07

## 2024-08-07 NOTE — PROCEDURES
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    Andi Morris MD  8/7/2024  Interventional Radiology  [facility]

## 2024-08-07 NOTE — IVS NOTE
DISCHARGE NOTE     Pt is able to sit up and ambulate without difficulty.   Pt voided and tolerated fluids.  Procedural site remains dry and intact with good circulation, motion and sensation.   No signs or symptoms of bleeding/hematoma noted.   Pt denies any pain or discomfort at this time.  IV access removed  Instructions provided, patient/family verbalizes understanding.   Dr. Morris spoke with patient/family post procedure.     Pt discharge via wheelchair to Main Lobby with grandson  Pt discharge via stretcher to Nursing Facility         New Prescription: none

## 2024-08-07 NOTE — INTERVAL H&P NOTE
The above referenced H&P was reviewed by Andi Morris MD on 8/7/2024, the patient was examined and no significant changes have occurred in the patient's condition since the H&P was performed.  Risks, benefits, alternative treatments and consequences of no treatment were discussed.  We will proceed with procedure as planned.      Andi Morris MD  8/7/2024  10:45 AM

## 2024-08-07 NOTE — DISCHARGE INSTRUCTIONS
Understanding Venogram  A venogram is a type of imaging procedure. It uses X-rays and a special dye to look at veins in your body. The dye is called a contrast material. An X-ray is a picture of the inside of your body. Low levels of radiation create the image.   Why a venogram is done  A venogram is often done to find blood clots in the veins. It can help diagnose deep vein thrombosis. This is a blood clot in a deep vein, often in your legs. It's also done to find other problems related to the veins. These include varicose veins, a vein defect, or the narrowing of a vein.   How a venogram is done  This procedure is often done on an outpatient basis. That means you can go home afterward. During the procedure:   You will lie down on an exam table. The table may tilt to help the dye move through your body.  You may be given medicine, so you don’t feel any pain.  A healthcare provider will put a catheter, or needle, into 1 of your veins. The location depends on which body part is being X-rayed.  They will inject the dye into your vein.  X-rays are taken as the dye moves through your body.  You may be asked to hold your breath during the procedure.  Risks of a venogram  Pain  Blood clots  Allergy to the dye  Kidney problems    [employer] last reviewed this educational content on 9/1/2022 © 2000-2023 [employer]. All rights reserved. This information is not intended as a substitute for professional medical care. Always follow your healthcare professional's instructions.      NTERVENTIONAL RADIOLOGY  [facility]  [phone]         HOME CARE INSTRUCTIONS FOLLOWING VENOGRAM WITH INTERVENTIONS PROCEDURE      Activity    DO NOT drive after the procedure. You may resume driving the following day according to the nurse or physician’s instructions    Plan on resting and relaxing tonight and tomorrow    Do not lift anything over 8 pounds for 1 week   Avoid strenuous  activity for the next 24 hours    Avoid drinking alcohol for the next 24 hours    Resume your normal activity after 24 hours   No blood pressure cuffs or blood draws, avoid tight restrictive clothing to the affected arm for 1 week.     What is Normal?    The procedure site may appear bruised or discolored    The procedure site may be tender to the touch    There may be a small amount of drainage on the bandage     Special Instructions    The bandage is to remain in place for 24 hours    After 24 hours, you must remove the bandage. You should shower after removing the bandage, and wash the procedure site gently with soap and water. (If you choose to wear a bandage for a few days, make sure it remains clean and dry and that it is changed daily.)     When you should NOTIFY YOUR PHYSICIAN    If you have persistent pain at the procedure site    If you experience signs of a fever, temperature >101o, chills, infection (redness, swelling, thick yellow drainage, or a foul odor from the procedure site)     Other    You may resume your present diet, unless otherwise specified by your physician    You may resume all of your medications as prescribed, unless otherwise directed by your physician.       Contact Interventional Radiology at [phone] if you have severe/unrelieved pain, fever, chills, dizziness/lightheadedness, or drainage/bleeding from your incision site.

## 2024-08-20 ENCOUNTER — OFFICE VISIT (OUTPATIENT)
Dept: INTERNAL MEDICINE CLINIC | Facility: CLINIC | Age: 89
End: 2024-08-20

## 2024-08-20 VITALS
TEMPERATURE: 98 F | HEART RATE: 78 BPM | WEIGHT: 176 LBS | HEIGHT: 61 IN | OXYGEN SATURATION: 96 % | SYSTOLIC BLOOD PRESSURE: 130 MMHG | DIASTOLIC BLOOD PRESSURE: 84 MMHG | RESPIRATION RATE: 18 BRPM | BODY MASS INDEX: 33.23 KG/M2

## 2024-08-20 DIAGNOSIS — R42 DIZZY SPELLS: ICD-10-CM

## 2024-08-20 DIAGNOSIS — R13.10 SWALLOWING PROBLEM: ICD-10-CM

## 2024-08-20 DIAGNOSIS — J30.2 SEASONAL ALLERGIES: ICD-10-CM

## 2024-08-20 DIAGNOSIS — R43.8 BITTER TASTE: Primary | ICD-10-CM

## 2024-08-20 DIAGNOSIS — K21.9 GASTROESOPHAGEAL REFLUX DISEASE, UNSPECIFIED WHETHER ESOPHAGITIS PRESENT: ICD-10-CM

## 2024-08-20 PROCEDURE — 99214 OFFICE O/P EST MOD 30 MIN: CPT | Performed by: INTERNAL MEDICINE

## 2024-08-20 RX ORDER — FUROSEMIDE 40 MG
20 TABLET ORAL DAILY
Qty: 30 TABLET | Refills: 2 | Status: SHIPPED | OUTPATIENT
Start: 2024-08-20

## 2024-08-20 RX ORDER — MECLIZINE HYDROCHLORIDE 25 MG/1
25 TABLET ORAL 3 TIMES DAILY PRN
Qty: 30 TABLET | Refills: 0 | Status: SHIPPED | OUTPATIENT
Start: 2024-08-20

## 2024-08-20 RX ORDER — POTASSIUM CHLORIDE 750 MG/1
20 TABLET, EXTENDED RELEASE ORAL DAILY
Qty: 30 TABLET | Refills: 2 | Status: SHIPPED | OUTPATIENT
Start: 2024-08-20

## 2024-08-20 RX ORDER — PANTOPRAZOLE SODIUM 40 MG/1
40 TABLET, DELAYED RELEASE ORAL
Qty: 30 TABLET | Refills: 1 | Status: SHIPPED | OUTPATIENT
Start: 2024-08-20

## 2024-08-21 RX ORDER — PANTOPRAZOLE SODIUM 40 MG/1
40 TABLET, DELAYED RELEASE ORAL
Qty: 90 TABLET | Refills: 0 | OUTPATIENT
Start: 2024-08-21

## 2024-08-27 NOTE — PROGRESS NOTES
Subjective:   Patient ID: Ivis Pablo is a 89 year old female.    HPI    Pt comes in with complaint of Vertigo on and off room spinning   )     Pt also with complaint of thrush LOV w/ Dr. Abrams on 6/26 was tx for this. Per pt, states this got better but has come back also seen ENT for same, has bitter tast in mouth, H pylori negative   Pt also Asking for refills on Lasix and Potassium.        History/Other:   Review of Systems   Constitutional: Negative.    HENT: Negative.          Biter taste, ? thrush   Eyes: Negative.    Respiratory: Negative.     Cardiovascular:  Positive for leg swelling.   Gastrointestinal: Negative.    Genitourinary: Negative.    Musculoskeletal: Negative.    Skin: Negative.    Neurological: Negative.    Psychiatric/Behavioral: Negative.       Current Outpatient Medications   Medication Sig Dispense Refill    furosemide 40 MG Oral Tab Take 0.5 tablets (20 mg total) by mouth daily. 30 tablet 2    Potassium Chloride ER 10 MEQ Oral Tab CR Take 2 tablets (20 mEq total) by mouth daily. 30 tablet 2    pantoprazole (PROTONIX) 40 MG Oral Tab EC Take 1 tablet (40 mg total) by mouth every morning before breakfast. 30 tablet 1    meclizine 25 MG Oral Tab Take 1 tablet (25 mg total) by mouth 3 (three) times daily as needed. 30 tablet 0    aspirin 81 MG Oral Tab EC Take 1 tablet (81 mg total) by mouth daily.      diphenhydrAMINE 12.5 MG/5ML Oral Elixir Please crush 8 tablets of hydrocortisone 20 mg and mix in 500 cc of Benadryl elixir. 5 cc swish and swallow q.6 hours (Patient not taking: Reported on 8/20/2024) 500 mL 0    fesoterodine ER 4 MG Oral Tablet 24 Hr Take 1 tablet (4 mg total) by mouth daily. (Patient not taking: Reported on 8/20/2024)      Solifenacin Succinate 10 MG Oral Tab Take 1 tablet (10 mg total) by mouth daily. (Patient not taking: Reported on 8/20/2024)      tolterodine ER 4 MG Oral Capsule SR 24 Hr Take 1 capsule (4 mg total) by mouth daily. (Patient not taking: Reported on  8/20/2024)      predniSONE 10 MG Oral Tab 6 tablets daily day one through 5, 4 tablets daily on days  6 and 7, 2 tablets daily on days 8 and 9, One tablet daily on days 10 and 11. (Patient not taking: Reported on 8/20/2024) 44 tablet 0    sulfamethoxazole-trimethoprim -160 MG Oral Tab per tablet Take 1 tablet by mouth every 12 (twelve) hours. (Patient not taking: Reported on 8/20/2024) 14 tablet 0    naphazoline-pheniramine 0.025-0.3 % Ophthalmic Solution Apply 1 Application to eye As Directed. (Patient not taking: Reported on 8/20/2024)       Allergies:  Allergies   Allergen Reactions    Penicillins HIVES, ITCHING, OTHER (SEE COMMENTS) and RASH     itching in abdominal area    itching in abdominal area   itching in abdominal area      Other reaction(s): PRURITUS   itching in abdominal area    Erythromycin RASH       Objective:   Physical Exam  Vitals and nursing note reviewed.   Constitutional:       Appearance: She is well-developed.   HENT:      Head: Normocephalic and atraumatic.      Right Ear: External ear normal.      Left Ear: External ear normal.      Nose: Nose normal.   Eyes:      Conjunctiva/sclera: Conjunctivae normal.      Pupils: Pupils are equal, round, and reactive to light.   Cardiovascular:      Rate and Rhythm: Normal rate and regular rhythm.      Heart sounds: Normal heart sounds.   Pulmonary:      Effort: Pulmonary effort is normal.      Breath sounds: Normal breath sounds.   Abdominal:      General: Bowel sounds are normal.      Palpations: Abdomen is soft.   Genitourinary:     Vagina: Normal.   Musculoskeletal:         General: Normal range of motion.      Cervical back: Normal range of motion and neck supple.      Right lower leg: Edema present.      Left lower leg: Edema present.      Comments: Trace edema    Skin:     General: Skin is warm and dry.   Neurological:      Mental Status: She is alert and oriented to person, place, and time.      Deep Tendon Reflexes: Reflexes are normal  and symmetric.   Psychiatric:         Behavior: Behavior normal.         Thought Content: Thought content normal.         Judgment: Judgment normal.         Assessment & Plan:   1. Bitter taste - ? Gerd we will treat with PPI and let us know if not better   2. Dizzy spells will give meclizine let us know if not better   3. Gastroesophageal reflux disease, unspecified whether esophagitis present as above   4. Swallowing problem patient follows with ENT   5. Seasonal allergies as need Zyrtec make sure is not causing symptoms       No orders of the defined types were placed in this encounter.      Meds This Visit:  Requested Prescriptions     Signed Prescriptions Disp Refills    furosemide 40 MG Oral Tab 30 tablet 2     Sig: Take 0.5 tablets (20 mg total) by mouth daily.    Potassium Chloride ER 10 MEQ Oral Tab CR 30 tablet 2     Sig: Take 2 tablets (20 mEq total) by mouth daily.    pantoprazole (PROTONIX) 40 MG Oral Tab EC 30 tablet 1     Sig: Take 1 tablet (40 mg total) by mouth every morning before breakfast.    meclizine 25 MG Oral Tab 30 tablet 0     Sig: Take 1 tablet (25 mg total) by mouth 3 (three) times daily as needed.       Imaging & Referrals:  None

## 2024-08-28 ENCOUNTER — OFFICE VISIT (OUTPATIENT)
Dept: DERMATOLOGY CLINIC | Facility: CLINIC | Age: 89
End: 2024-08-28

## 2024-08-28 DIAGNOSIS — D22.9 MULTIPLE NEVI: ICD-10-CM

## 2024-08-28 DIAGNOSIS — B35.1 ONYCHOMYCOSIS: ICD-10-CM

## 2024-08-28 DIAGNOSIS — B35.3 TINEA PEDIS OF LEFT FOOT: Primary | ICD-10-CM

## 2024-08-28 DIAGNOSIS — L82.1 SK (SEBORRHEIC KERATOSIS): ICD-10-CM

## 2024-08-28 DIAGNOSIS — L30.9 DERMATITIS: ICD-10-CM

## 2024-08-28 DIAGNOSIS — Q80.9 XERODERMA: ICD-10-CM

## 2024-08-28 DIAGNOSIS — R60.0 BILATERAL LOWER EXTREMITY EDEMA: ICD-10-CM

## 2024-08-28 PROCEDURE — 99203 OFFICE O/P NEW LOW 30 MIN: CPT | Performed by: DERMATOLOGY

## 2024-08-28 RX ORDER — AMMONIUM LACTATE 12 G/100G
1 LOTION TOPICAL 2 TIMES DAILY
Qty: 400 G | Refills: 11 | Status: SHIPPED | OUTPATIENT
Start: 2024-08-28

## 2024-08-28 RX ORDER — KETOCONAZOLE 20 MG/G
1 CREAM TOPICAL 2 TIMES DAILY
Qty: 60 G | Refills: 3 | Status: SHIPPED | OUTPATIENT
Start: 2024-08-28

## 2024-08-29 ENCOUNTER — TELEPHONE (OUTPATIENT)
Dept: DERMATOLOGY CLINIC | Facility: CLINIC | Age: 89
End: 2024-08-29

## 2024-08-29 NOTE — TELEPHONE ENCOUNTER
Paged. Patient with question on application of medications.  She was able to  both the ketoconazole and the lactic acid (with goodrx the lactic acid was five dollars)    Confirmed- use the ketoconazole twice a day over both feet and toes and the lactic acid - ok over this and cetaphil- order not important if not irritated.     She will call back with any other questions.

## 2024-08-30 ENCOUNTER — APPOINTMENT (OUTPATIENT)
Dept: ULTRASOUND IMAGING | Facility: HOSPITAL | Age: 89
End: 2024-08-30
Attending: EMERGENCY MEDICINE
Payer: COMMERCIAL

## 2024-08-30 ENCOUNTER — APPOINTMENT (OUTPATIENT)
Dept: GENERAL RADIOLOGY | Facility: HOSPITAL | Age: 89
End: 2024-08-30
Attending: EMERGENCY MEDICINE
Payer: COMMERCIAL

## 2024-08-30 ENCOUNTER — APPOINTMENT (OUTPATIENT)
Dept: CT IMAGING | Facility: HOSPITAL | Age: 89
End: 2024-08-30
Attending: EMERGENCY MEDICINE
Payer: COMMERCIAL

## 2024-08-30 ENCOUNTER — HOSPITAL ENCOUNTER (EMERGENCY)
Facility: HOSPITAL | Age: 89
Discharge: HOME | End: 2024-08-30
Attending: EMERGENCY MEDICINE
Payer: COMMERCIAL

## 2024-08-30 ENCOUNTER — TELEPHONE (OUTPATIENT)
Dept: INTERVENTIONAL RADIOLOGY/VASCULAR | Facility: HOSPITAL | Age: 89
End: 2024-08-30

## 2024-08-30 VITALS
HEART RATE: 84 BPM | OXYGEN SATURATION: 96 % | DIASTOLIC BLOOD PRESSURE: 76 MMHG | BODY MASS INDEX: 34 KG/M2 | SYSTOLIC BLOOD PRESSURE: 153 MMHG | TEMPERATURE: 98 F | WEIGHT: 180 LBS | RESPIRATION RATE: 24 BRPM

## 2024-08-30 DIAGNOSIS — R06.02 SHORTNESS OF BREATH: ICD-10-CM

## 2024-08-30 DIAGNOSIS — M71.22 BAKER'S CYST OF KNEE, LEFT: ICD-10-CM

## 2024-08-30 DIAGNOSIS — M79.89 LEFT LEG SWELLING: Primary | ICD-10-CM

## 2024-08-30 LAB
ALBUMIN SERPL-MCNC: 3.7 G/DL (ref 3.2–4.8)
ALBUMIN/GLOB SERPL: 1.5 {RATIO} (ref 1–2)
ALP LIVER SERPL-CCNC: 68 U/L
ALT SERPL-CCNC: <7 U/L
ANION GAP SERPL CALC-SCNC: 8 MMOL/L (ref 0–18)
AST SERPL-CCNC: 22 U/L (ref ?–34)
BASOPHILS # BLD AUTO: 0 X10(3) UL (ref 0–0.2)
BASOPHILS NFR BLD AUTO: 0 %
BILIRUB SERPL-MCNC: 0.3 MG/DL (ref 0.2–1.1)
BUN BLD-MCNC: 12 MG/DL (ref 9–23)
BUN/CREAT SERPL: 15 (ref 10–20)
CALCIUM BLD-MCNC: 9.1 MG/DL (ref 8.7–10.4)
CHLORIDE SERPL-SCNC: 108 MMOL/L (ref 98–112)
CO2 SERPL-SCNC: 28 MMOL/L (ref 21–32)
CREAT BLD-MCNC: 0.8 MG/DL
D DIMER PPP FEU-MCNC: 1.27 UG/ML FEU (ref ?–0.88)
DEPRECATED RDW RBC AUTO: 48.2 FL (ref 35.1–46.3)
EGFRCR SERPLBLD CKD-EPI 2021: 71 ML/MIN/1.73M2 (ref 60–?)
EOSINOPHIL # BLD AUTO: 0.11 X10(3) UL (ref 0–0.7)
EOSINOPHIL NFR BLD AUTO: 2.7 %
ERYTHROCYTE [DISTWIDTH] IN BLOOD BY AUTOMATED COUNT: 15.3 % (ref 11–15)
GLOBULIN PLAS-MCNC: 2.4 G/DL (ref 2–3.5)
GLUCOSE BLD-MCNC: 99 MG/DL (ref 70–99)
HCT VFR BLD AUTO: 34.3 %
HGB BLD-MCNC: 11.2 G/DL
IMM GRANULOCYTES # BLD AUTO: 0.01 X10(3) UL (ref 0–1)
IMM GRANULOCYTES NFR BLD: 0.2 %
LYMPHOCYTES # BLD AUTO: 1.54 X10(3) UL (ref 1–4)
LYMPHOCYTES NFR BLD AUTO: 37.4 %
MCH RBC QN AUTO: 28.4 PG (ref 26–34)
MCHC RBC AUTO-ENTMCNC: 32.7 G/DL (ref 31–37)
MCV RBC AUTO: 87.1 FL
MONOCYTES # BLD AUTO: 0.52 X10(3) UL (ref 0.1–1)
MONOCYTES NFR BLD AUTO: 12.6 %
NEUTROPHILS # BLD AUTO: 1.94 X10 (3) UL (ref 1.5–7.7)
NEUTROPHILS # BLD AUTO: 1.94 X10(3) UL (ref 1.5–7.7)
NEUTROPHILS NFR BLD AUTO: 47.1 %
NT-PROBNP SERPL-MCNC: 83 PG/ML (ref ?–450)
OSMOLALITY SERPL CALC.SUM OF ELEC: 298 MOSM/KG (ref 275–295)
PLATELET # BLD AUTO: 502 10(3)UL (ref 150–450)
POTASSIUM SERPL-SCNC: 3.6 MMOL/L (ref 3.5–5.1)
PROT SERPL-MCNC: 6.1 G/DL (ref 5.7–8.2)
RBC # BLD AUTO: 3.94 X10(6)UL
SODIUM SERPL-SCNC: 144 MMOL/L (ref 136–145)
TROPONIN I SERPL HS-MCNC: 8 NG/L
WBC # BLD AUTO: 4.1 X10(3) UL (ref 4–11)

## 2024-08-30 PROCEDURE — 80053 COMPREHEN METABOLIC PANEL: CPT | Performed by: EMERGENCY MEDICINE

## 2024-08-30 PROCEDURE — 85379 FIBRIN DEGRADATION QUANT: CPT | Performed by: EMERGENCY MEDICINE

## 2024-08-30 PROCEDURE — 74177 CT ABD & PELVIS W/CONTRAST: CPT | Performed by: EMERGENCY MEDICINE

## 2024-08-30 PROCEDURE — 93005 ELECTROCARDIOGRAM TRACING: CPT

## 2024-08-30 PROCEDURE — 85025 COMPLETE CBC W/AUTO DIFF WBC: CPT | Performed by: EMERGENCY MEDICINE

## 2024-08-30 PROCEDURE — 84484 ASSAY OF TROPONIN QUANT: CPT | Performed by: EMERGENCY MEDICINE

## 2024-08-30 PROCEDURE — 99285 EMERGENCY DEPT VISIT HI MDM: CPT

## 2024-08-30 PROCEDURE — 83880 ASSAY OF NATRIURETIC PEPTIDE: CPT | Performed by: EMERGENCY MEDICINE

## 2024-08-30 PROCEDURE — 36415 COLL VENOUS BLD VENIPUNCTURE: CPT

## 2024-08-30 PROCEDURE — 71045 X-RAY EXAM CHEST 1 VIEW: CPT | Performed by: EMERGENCY MEDICINE

## 2024-08-30 PROCEDURE — 71260 CT THORAX DX C+: CPT | Performed by: EMERGENCY MEDICINE

## 2024-08-30 PROCEDURE — 93970 EXTREMITY STUDY: CPT | Performed by: EMERGENCY MEDICINE

## 2024-08-30 PROCEDURE — 99284 EMERGENCY DEPT VISIT MOD MDM: CPT

## 2024-08-30 PROCEDURE — 93010 ELECTROCARDIOGRAM REPORT: CPT

## 2024-08-30 NOTE — ED PROVIDER NOTES
Patient Seen in: [facility] Emergency Department    History     Chief Complaint   Patient presents with   • Swelling   • Shortness Of Breath     Stated Complaint: DVT r/o    HPI    Patient is a very pleasant 89F hx of prior uterine CA s/p hysterectomy presents to the ED with complaints of L leg swelling and occasional SOB. Pt states that she recently had an iliac stent placed in her left groin by Dr. Morris earlier in August on 8/7/2024. She states over the past week however she has had worsening swelling to the left leg and pain behind her knee, and now feels both of her legs are a bit swollen. Pain is worse with ambulation, improved at rest. Denies trauma or fall. Also reports mild shortness of breath over the past 3 days but has still been ambulatory. She denies any episodes of chest pain. She denies fever, cough, hemoptysis, abdominal pain, vomiting, diarrhea, dysuria. She denies recent travel or hx of DVT/PE. She denies estrogen use. She denies hx of CHF.     Past Medical History:   • Edema   • Uterine cancer (HCC)       Past Surgical History:   Procedure Laterality Date   • Cataract     • Hysterectomy     • Other surgical history  02/2024    botox procedure of bladder            Family History   Problem Relation Age of Onset   • Other (tuberculosis) Father    • Hypertension Mother    • Alcohol abuse Mother    • Hypertension Sister    • Heart Attack Sister        Social History     Socioeconomic History   • Marital status: Unknown   Tobacco Use   • Smoking status: Never     Passive exposure: Never   • Smokeless tobacco: Never   Vaping Use   • Vaping status: Never Used   Substance and Sexual Activity   • Alcohol use: Not Currently   • Drug use: Not Currently   Other Topics Concern   • Reaction to local anesthetic No   • Pt has a pacemaker No   • Pt has a defibrillator No       Review of Systems    Positive for stated complaint: DVT r/o  Other systems are as noted in HPI.  Constitutional and vital  signs reviewed.      All other systems reviewed and negative except as noted above.    PSFH elements reviewed from today and agreed except as otherwise stated in HPI.    Physical Exam     ED Triage Vitals [08/30/24 1730]   BP (!) 161/83   Pulse 81   Resp 20   Temp 98.4 °F (36.9 °C)   Temp src Oral   SpO2 96 %   O2 Device None (Room air)       Current:/76   Pulse 84   Temp 98.4 °F (36.9 °C) (Oral)   Resp 24   Wt 81.6 kg   SpO2 96%   BMI 34.01 kg/m²   PULSE OX 96% on RA   GENERAL: awake alert no distress  HEAD: normocephalic, atraumatic  THROAT: mm moist, no lesions  NECK: supple, no meningeal signs  LUNGS:CTAB no wheezes or crackles, no respiratory distress, no tachypnea   CARDIO: RRR no MRG, 2+ radial and dp pulses bilaterally   GI: abdomen is soft and non tender, no masses   EXTREMITIES: trace leg edema bilat LE that is nonpitting, L > R with mild ttp behind the L calf, no leg warmth or erythema   NEURO: alert and oriented x3, ambulates with steady gait   SKIN: good skin turgor, no  rashes  PSYCH: calm, cooperative      ED Course     Labs Reviewed   CBC WITH DIFFERENTIAL WITH PLATELET - Abnormal; Notable for the following components:       Result Value    HGB 11.2 (*)     HCT 34.3 (*)     RDW-SD 48.2 (*)     RDW 15.3 (*)     .0 (*)     All other components within normal limits   COMP METABOLIC PANEL (14) - Abnormal; Notable for the following components:    Calculated Osmolality 298 (*)     ALT <7 (*)     All other components within normal limits   D-DIMER - Abnormal; Notable for the following components:    D-Dimer 1.27 (*)     All other components within normal limits   PRO BETA NATRIURETIC PEPTIDE - Normal   TROPONIN I HIGH SENSITIVITY - Normal   RAINBOW DRAW LAVENDER   RAINBOW DRAW LIGHT GREEN   RAINBOW DRAW BLUE     EKG my interpretation: Interpretation of EKG shows normal sinus rhythm rate 75 beats minute, normal axis, normal intervals, QTc 446 ms, no STEMI         MDM     Monitor  Interpretation:  Sinus rhythm     Radiology Interpretation:  On my evaluation of cxr no focal pulm vascular congestion or pneumothorax, no widened mediastinum       MDM      This patient presents with c/o leg swelling L>R and mild SOB for a few days.     On chart review 8/7/2024 pt underwent IR L iliac venography for May Thurner syndrome     Differential diagnoses considered includes, but is not limited to:   DVT  Bakers cyst   CHF   PE   Pneumonia  Pneumothorax   Less likely atypical ACS     HEART score of 3 - low risk (+2 age, +1 risk factor)     Will obtain the following tests: cbc, cmp, bnp, trop x1, d-dimer, cxr, ecg, DVT US   Will administer the following medications/therapies: n/a pt denies significant pain at this time     Please see ED course for my independent review of these tests/imaging results.        ED Course as of 08/31/24 0111  ------------------------------------------------------------  Time: 08/30 1829  Value: Hemoglobin(!): 11.2  Comment: Pt with mild anemia. No leukocytosis .  ------------------------------------------------------------  Time: 08/30 1837  Comment: Case d/w Dr. Morris bedside   ------------------------------------------------------------  Time: 08/30 1908  Comment: Bnp normal. Trp normal. Cmp unremarkable no Favian   ------------------------------------------------------------  Time: 08/30 1910  Value: XR CHEST AP PORTABLE  (CPT=56231)  Comment: Details      Reading Physician Reading Date Result Priority  Ja Mackenzie MD  [phone] 8/30/2024     Narrative  PROCEDURE: XR CHEST AP PORTABLE  (CPT=71045)  TIME: 1839 hours.       COMPARISON: None.     INDICATIONS: Shortness of breath x3 days. Denies chest pain.     TECHNIQUE:   Single view.       FINDINGS:  CARDIAC/MEDIAST: Borderline cardiomegaly.  Mildly tortuous thoracic aorta.  There is no vascular congestion.  LUNGS/PLEURA: Moderate elevation right hemidiaphragm.  Mild bibasilar linear opacity likely scarring versus  atelectasis.  No pleural effusion.  No pneumothorax.  OTHER: Degenerative change both shoulders and thoracic spine.      ------------------------------------------------------------  Time: 08/30 1910  Comment: Interpretation of EKG shows normal sinus rhythm rate 75 beats minute, normal axis, normal intervals, QTc 446 ms, no STEMI  ------------------------------------------------------------  Time: 08/30 2041  Value: US VENOUS DOPPLER LEG BILAT - DIAG IMG (CPT=93970)  Comment: THROMBI: None visible.  COMPRESSIBILITY:   Normal.  OTHER: 7.3 x 0.9 by 3.0 cm left Baker's cyst.              Impression  CONCLUSION:     No bilateral lower extremity DVT.     Left Baker's cyst.    ------------------------------------------------------------  Time: 08/30 2100  Value: CT ABDOMEN+PELVIS(CONTRAST ONLY)(CPT=74177)  Comment: Impression  CONCLUSION:     No evidence of acute abnormality in the abdomen or pelvis.     Colonic diverticulosis.     Hysterectomy.     Left common iliac venous stent.     Lesser incidental findings as above.    ------------------------------------------------------------  Time: 08/30 2100  Value: CT CHEST PE AORTA (IV ONLY) (CPT=71260)  Comment:   Impression  CONCLUSION:     No evidence of pulmonary embolism to the bilateral segmental arterial level.     Small hiatal hernia.    ------------------------------------------------------------  Time: 08/30 2123  Comment: Patient reassessed, ambulating to the bathroom steady gait no shortness of breath.  States she is feeling well at this time.  I counseled her extensively on all of her results.  I did offer her observation in the hospital, while she is low risk heart score given her age I did want to offer her observation given her age and symptoms however after shared decision making she would prefer to follow-up as an outpatient which I do feel is reasonable.  I texted the on-call NAY DOMÍNGUEZ who will contact the patient for expedited follow-up.  Counseled to return  if new or worsening symptoms occur and continue supportive care measures including elevation and compression of her extremities.         Disposition and Plan     Clinical Impression:  1. Left leg swelling    2. Baker's cyst of knee, left    3. Shortness of breath         Disposition:  Discharge  8/30/2024  9:26 pm    Follow-up:  Saw Pacheco MD  [address] [place] [id number]  [phone]    Schedule an appointment as soon as possible for a visit in 2 day(s)      [facility] Emergency Department  [address]  [place] [address]  [phone]  Go to  If symptoms worsen, immediately    Carrol Durán DO  [address]  [address]  [place] [address]  [phone]    Schedule an appointment as soon as possible for a visit in 1 week(s)      Andi Morris MD  [address]  [place] [address]  [phone]    Schedule an appointment as soon as possible for a visit in 1 week(s)      We recommend that you schedule follow up care with a primary care provider within the next three months to obtain basic health screening including reassessment of your blood pressure.      Medications Prescribed:  Discharge Medication List as of 8/30/2024  9:42 PM              Jazmyn Combs DO  Attending Physician  Emergency Medicine

## 2024-08-30 NOTE — ED INITIAL ASSESSMENT (HPI)
Sent to rule out blood clot in left leg.   Notes pain and swelling   No current blood thinners.    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 Denies chest pain.

## 2024-08-30 NOTE — PROGRESS NOTES
Interventional Radiology  Progress Note    Patient with non-thrombotic May Thurner disease post left iliac vein stenting for left leg swelling on 8/7/24. She called IR today due to no improvement of symptoms and worsening left leg pain/swelling. I asked patient to come to ED for evaluation.    Andi Morris MD  8/30/2024  Interventional Radiology  [facility]

## 2024-08-31 LAB
ATRIAL RATE: 75 BPM
P AXIS: 60 DEGREES
P-R INTERVAL: 166 MS
Q-T INTERVAL: 400 MS
QRS DURATION: 86 MS
QTC CALCULATION (BEZET): 446 MS
R AXIS: -4 DEGREES
T AXIS: 7 DEGREES
VENTRICULAR RATE: 75 BPM

## 2024-08-31 NOTE — CONSULTS
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 She called IR today due to persistent left leg swelling a now right leg swelling.  I spoke with her and advised her to come to the ED for evaluation.    She states that she has  not had improvement of her leg swelling since the procedure.  She has been wearing thigh-high compression stockings.  Reports some pelvic pain and mild dyspnea.    She has been seen by her primary care physician and started on Lasix.  No anticoagulation.    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  She presents due to persistence of her symptoms is currently affecting her quality of life.    Etiology of her ongoing leg swelling is unclear.  Recommend bilateral lower extremity DVT study as well as CT venogram to assess for stent patency.  Would also recommend echocardiogram for heart failure evaluation.    Plan:  -Bilateral lower extremity DVT study  -CT venogram abdomen/pelvis  -IR will follow    I spent 20 minutes speaking with the patient, reviewing relevant imaging/labs, and contributing to the treatment plan.    Andi Morris MD  8/30/2024  Interventional  Radiology  [facility]

## 2024-08-31 NOTE — PROGRESS NOTES
Interventional Radiology  Progress Note    Imaging demonstrates no lower extremity DVT, patient left iliac vein stent, and no PE. Unclear etiology of lower extremity swelling. Possibly heart failure vs lymphedema vs superficial venous insufficiency.    Recommend cardiology consult outpatient. IR will follow up with patient in clinic.    Andi Morris MD  8/30/2024  Interventional Radiology  [facility]

## 2024-08-31 NOTE — DISCHARGE INSTRUCTIONS
Thank you for seeking care at [facility] Emergency Department.  You have been seen and evaluated.  Please continue compression and elevation of your left leg.  You are found to have a Baker's cyst which is treated supportively.  Please follow-up with the cardiologist within 7 days.  They should be calling you this coming Tuesday, 9/2/2024.   We discussed the results of your workup   Please read the instructions provided   If given prescriptions, take as instructed    Remember, your care process does not end after your visit today. Please follow-up with your doctor within 1-2 days for a follow-up check to ensure you are  improving, to see if you need any further evaluation/testing, or to evaluate for any alternate diagnoses.     Please return to the emergency department immediately if you develop chest pain, difficulty breathing, inability to drink liquids without vomiting, one sided numbness or weakness, slurred speech, severe headache, or if you develop any other new or concerning symptoms as these could be signs of more serious medical illness.    We hope you feel better.

## 2024-09-02 NOTE — PROGRESS NOTES
Ivis Pablo is a 89 year old female.    Chief Complaint   Patient presents with    Derm Problem     New pt present w/ dryness to L Foot and beginning on R Foot for months. Not using anything. Denies any personal or family hx of chronic skin disease.              Penicillins and Erythromycin  Current Outpatient Medications   Medication Sig Dispense Refill    ketoconazole 2 % External Cream Apply 1 Application topically 2 (two) times daily. Apply to  both feet 60 g 3    ammonium lactate 12 % External Lotion Apply 1 Application topically 2 (two) times daily. 400 g 11    furosemide 40 MG Oral Tab Take 0.5 tablets (20 mg total) by mouth daily. 30 tablet 2    Potassium Chloride ER 10 MEQ Oral Tab CR Take 2 tablets (20 mEq total) by mouth daily. 30 tablet 2    pantoprazole (PROTONIX) 40 MG Oral Tab EC Take 1 tablet (40 mg total) by mouth every morning before breakfast. 30 tablet 1    meclizine 25 MG Oral Tab Take 1 tablet (25 mg total) by mouth 3 (three) times daily as needed. 30 tablet 0    aspirin 81 MG Oral Tab EC Take 1 tablet (81 mg total) by mouth daily.      diphenhydrAMINE 12.5 MG/5ML Oral Elixir Please crush 8 tablets of hydrocortisone 20 mg and mix in 500 cc of Benadryl elixir. 5 cc swish and swallow q.6 hours (Patient not taking: Reported on 8/20/2024) 500 mL 0    fesoterodine ER 4 MG Oral Tablet 24 Hr Take 1 tablet (4 mg total) by mouth daily. (Patient not taking: Reported on 8/20/2024)      Solifenacin Succinate 10 MG Oral Tab Take 1 tablet (10 mg total) by mouth daily. (Patient not taking: Reported on 8/20/2024)      tolterodine ER 4 MG Oral Capsule SR 24 Hr Take 1 capsule (4 mg total) by mouth daily. (Patient not taking: Reported on 8/20/2024)      predniSONE 10 MG Oral Tab 6 tablets daily day one through 5, 4 tablets daily on days  6 and 7, 2 tablets daily on days 8 and 9, One tablet daily on days 10 and 11. (Patient not taking: Reported on 8/28/2024) 44 tablet 0    sulfamethoxazole-trimethoprim DS  800-160 MG Oral Tab per tablet Take 1 tablet by mouth every 12 (twelve) hours. (Patient not taking: Reported on 8/20/2024) 14 tablet 0    naphazoline-pheniramine 0.025-0.3 % Ophthalmic Solution Apply 1 Application to eye As Directed. (Patient not taking: Reported on 8/20/2024)        Past Medical History:    Edema    Uterine cancer (HCC)      Social History:  Social History     Socioeconomic History    Marital status: Unknown   Tobacco Use    Smoking status: Never     Passive exposure: Never    Smokeless tobacco: Never   Vaping Use    Vaping status: Never Used   Substance and Sexual Activity    Alcohol use: Not Currently    Drug use: Not Currently   Other Topics Concern    Reaction to local anesthetic No    Pt has a pacemaker No    Pt has a defibrillator No                 Current Outpatient Medications   Medication Sig Dispense Refill    ketoconazole 2 % External Cream Apply 1 Application topically 2 (two) times daily. Apply to  both feet 60 g 3    ammonium lactate 12 % External Lotion Apply 1 Application topically 2 (two) times daily. 400 g 11    furosemide 40 MG Oral Tab Take 0.5 tablets (20 mg total) by mouth daily. 30 tablet 2    Potassium Chloride ER 10 MEQ Oral Tab CR Take 2 tablets (20 mEq total) by mouth daily. 30 tablet 2    pantoprazole (PROTONIX) 40 MG Oral Tab EC Take 1 tablet (40 mg total) by mouth every morning before breakfast. 30 tablet 1    meclizine 25 MG Oral Tab Take 1 tablet (25 mg total) by mouth 3 (three) times daily as needed. 30 tablet 0    aspirin 81 MG Oral Tab EC Take 1 tablet (81 mg total) by mouth daily.      diphenhydrAMINE 12.5 MG/5ML Oral Elixir Please crush 8 tablets of hydrocortisone 20 mg and mix in 500 cc of Benadryl elixir. 5 cc swish and swallow q.6 hours (Patient not taking: Reported on 8/20/2024) 500 mL 0    fesoterodine ER 4 MG Oral Tablet 24 Hr Take 1 tablet (4 mg total) by mouth daily. (Patient not taking: Reported on 8/20/2024)      Solifenacin Succinate 10 MG Oral Tab  Take 1 tablet (10 mg total) by mouth daily. (Patient not taking: Reported on 8/20/2024)      tolterodine ER 4 MG Oral Capsule SR 24 Hr Take 1 capsule (4 mg total) by mouth daily. (Patient not taking: Reported on 8/20/2024)      predniSONE 10 MG Oral Tab 6 tablets daily day one through 5, 4 tablets daily on days  6 and 7, 2 tablets daily on days 8 and 9, One tablet daily on days 10 and 11. (Patient not taking: Reported on 8/28/2024) 44 tablet 0    sulfamethoxazole-trimethoprim -160 MG Oral Tab per tablet Take 1 tablet by mouth every 12 (twelve) hours. (Patient not taking: Reported on 8/20/2024) 14 tablet 0    naphazoline-pheniramine 0.025-0.3 % Ophthalmic Solution Apply 1 Application to eye As Directed. (Patient not taking: Reported on 8/20/2024)       Allergies:   Allergies   Allergen Reactions    Penicillins HIVES, ITCHING, OTHER (SEE COMMENTS) and RASH     itching in abdominal area    itching in abdominal area   itching in abdominal area      Other reaction(s): PRURITUS   itching in abdominal area    Erythromycin RASH       Past Medical History:    Edema    Uterine cancer (HCC)     Past Surgical History:   Procedure Laterality Date    Cataract      Hysterectomy      Other surgical history  02/2024    botox procedure of bladder     Social History     Socioeconomic History    Marital status: Unknown     Spouse name: Not on file    Number of children: Not on file    Years of education: Not on file    Highest education level: Not on file   Occupational History    Not on file   Tobacco Use    Smoking status: Never     Passive exposure: Never    Smokeless tobacco: Never   Vaping Use    Vaping status: Never Used   Substance and Sexual Activity    Alcohol use: Not Currently    Drug use: Not Currently    Sexual activity: Not on file   Other Topics Concern    Grew up on a farm Not Asked    History of tanning Not Asked    Outdoor occupation Not Asked    Breast feeding Not Asked    Reaction to local anesthetic No    Pt  has a pacemaker No    Pt has a defibrillator No   Social History Narrative    Not on file     Social Determinants of Health     Financial Resource Strain: Not on file   Food Insecurity: Not on file   Transportation Needs: Not on file   Physical Activity: Not on file   Stress: Not on file   Social Connections: Not on file   Housing Stability: Not on file     Family History   Problem Relation Age of Onset    Other (tuberculosis) Father     Hypertension Mother     Alcohol abuse Mother     Hypertension Sister     Heart Attack Sister                       HPI :      Chief Complaint   Patient presents with    Derm Problem     New pt present w/ dryness to L Foot and beginning on R Foot for months. Not using anything. Denies any personal or family hx of chronic skin disease.      Patient concerned with changes over left foot, right foot.  Lower extremity edema.  Patient with numerous health problems recently.  Frustrated.  Patient presents with concerns above.    Past notes/ records and appropriate/relevant lab results including pathology and past body maps reviewed. Updated and new information noted in current visit.       ROS:    Denies any other systemic complaints.  No fevers, chills, night sweats, sensitivity to the sun, deeper lumps or bumps.  No other skin complaints.  History, medications, allergies as noted.    Physical examination: Patient  well-developed well-nourished, alert oriented in no acute distress.  Exam of involved, appropriate areas of skin performed, including scalp, head, neck, face,nails, hair, external eyes, including conjunctival mucosa, eyelids, lips, external ears, back, chest, abdomen, arms, legs, palms.  Remarkable for lesions as noted   See map for details  Erythema scaling most prominent left foot, slight on right, onychomycotic changes left foot, SKs lower extremity edema bilateral, worse left, small nevi over the lateral foot noted  ASSESSMENT AND PLAN:     Encounter Diagnoses   Name  Primary?    Tinea pedis of left foot Yes    Onychomycosis     Dermatitis     Xeroderma     Bilateral lower extremity edema     SK (seborrheic keratosis)     Multiple nevi        Assessment / plan:    Onychomycosis tinea pedis.  Will begin topical therapy with ketoconazole twice daily both feet for at least 2 to 3 weeks  Lactic acid lotion as well for keratolytic  Continue Cetaphil moisturizer.  At this point would not recommend oral therapy however monitor.  Scaling should improve.  May recur in future monitor, possible may need more maintenance therapy likely source of tinea pedis from onychomycotic nail    Background of lower extremity edema likely contributing to current symptoms  Xerotic changes lactic acid lotion along with Cetaphil    Reassurance regarding benign seborrheic keratoses    .  Benign-appearing nevi over lateral foot, medial foot monitor    No suspicious lesions currently    Patient with thrush.  Has had extensive evaluation, SSA SSB negative.   Recent glucose normal  Will consider checking A1c, JOHANNA T-cell subsets if persistent symptoms    Pathophysiology discussed with patient.  Therapeutic options reviewed.  See  Medications in grid.  Instructions reviewed at length.     General skin care questions answered.   Reassurance regarding benign skin lesions.Signs and symptoms of skin cancer, ABCDE's of melanoma briefly reviewed.  Sunscreen use (broad-spectrum, ideally mineral, UVA UVB coverage SPF 30 or greater recommended), sun protection, encouraged.  Followup as noted in rtc or p.r.n.    Encounter Times new patient  Including precharting, reviewing chart, prior notes obtaining history: 10 minutes, medical exam :10 minutes, notes on body map, plan, counseling 10minutes My total time spent caring for the patient on the day of the encounter: 30 minutes     The patient indicates understanding of these issues and agrees to the plan.  The patient is asked to return as noted in follow-up /as noted  above    This note was generated using Dragon voice recognition software.  Please contact me regarding any confusion resulting from errors in recognition.  Note to patient and family: The 21st Century Cures Act makes medical notes like these available to patients. However, be advised this is a medical document. It is intended as [id number] communication and monitoring of a patient's care needs. It is written in medical language and may contain abbreviations or verbiage that are unfamiliar. It may appear blunt or direct. Medical documents are intended to carry relevant information, facts as evident and the clinical opinion of the practitioner.  No orders of the defined types were placed in this encounter.      Meds & Refills for this Visit:   Requested Prescriptions     Signed Prescriptions Disp Refills    ketoconazole 2 % External Cream 60 g 3     Sig: Apply 1 Application topically 2 (two) times daily. Apply to  both feet    ammonium lactate 12 % External Lotion 400 g 11     Sig: Apply 1 Application topically 2 (two) times daily.       Encounter Diagnoses   Name Primary?    Tinea pedis of left foot Yes    Onychomycosis     Dermatitis     Xeroderma     Bilateral lower extremity edema        No orders of the defined types were placed in this encounter.      Results From Past 48 Hours:  No results found for this or any previous visit (from the past 48 hour(s)).    Meds This Visit:      Imaging Orders:  None     Referral Orders:  No orders of the defined types were placed in this encounter.

## 2024-09-04 ENCOUNTER — OFFICE VISIT (OUTPATIENT)
Dept: INTERNAL MEDICINE CLINIC | Facility: CLINIC | Age: 89
End: 2024-09-04

## 2024-09-04 VITALS
HEART RATE: 74 BPM | HEIGHT: 61 IN | TEMPERATURE: 98 F | SYSTOLIC BLOOD PRESSURE: 120 MMHG | DIASTOLIC BLOOD PRESSURE: 67 MMHG | BODY MASS INDEX: 34.36 KG/M2 | WEIGHT: 182 LBS | OXYGEN SATURATION: 97 %

## 2024-09-04 DIAGNOSIS — L65.9 HAIR LOSS: ICD-10-CM

## 2024-09-04 DIAGNOSIS — Z00.00 ANNUAL PHYSICAL EXAM: Primary | ICD-10-CM

## 2024-09-04 DIAGNOSIS — Z78.0 MENOPAUSE: ICD-10-CM

## 2024-09-04 DIAGNOSIS — Z12.31 ENCOUNTER FOR SCREENING MAMMOGRAM FOR MALIGNANT NEOPLASM OF BREAST: ICD-10-CM

## 2024-09-04 LAB
DEPRECATED HBV CORE AB SER IA-ACNC: 113.1 NG/ML
IRON SATN MFR SERPL: 21 %
IRON SERPL-MCNC: 60 UG/DL
TIBC SERPL-MCNC: 280 UG/DL (ref 250–425)
TRANSFERRIN SERPL-MCNC: 188 MG/DL (ref 250–380)
TSI SER-ACNC: 4 MIU/ML (ref 0.55–4.78)
VIT B12 SERPL-MCNC: 343 PG/ML (ref 211–911)

## 2024-09-04 PROCEDURE — 36415 COLL VENOUS BLD VENIPUNCTURE: CPT | Performed by: INTERNAL MEDICINE

## 2024-09-04 PROCEDURE — 99397 PER PM REEVAL EST PAT 65+ YR: CPT | Performed by: INTERNAL MEDICINE

## 2024-09-04 NOTE — PROGRESS NOTES
HPI:   Ivis Pablo is a 89 year old female who presents for a complete physical exam.   Wt Readings from Last 3 Encounters:   09/04/24 182 lb (82.6 kg)   08/30/24 180 lb (81.6 kg)   08/20/24 176 lb (79.8 kg)     Body mass index is 34.39 kg/m².     AST (U/L)   Date Value   08/30/2024 22     ALT (U/L)   Date Value   08/30/2024 <7 (L)        Current Outpatient Medications   Medication Sig Dispense Refill    ketoconazole 2 % External Cream Apply 1 Application topically 2 (two) times daily. Apply to  both feet 60 g 3    ammonium lactate 12 % External Lotion Apply 1 Application topically 2 (two) times daily. 400 g 11    furosemide 40 MG Oral Tab Take 0.5 tablets (20 mg total) by mouth daily. 30 tablet 2    Potassium Chloride ER 10 MEQ Oral Tab CR Take 2 tablets (20 mEq total) by mouth daily. 30 tablet 2    pantoprazole (PROTONIX) 40 MG Oral Tab EC Take 1 tablet (40 mg total) by mouth every morning before breakfast. 30 tablet 1    aspirin 81 MG Oral Tab EC Take 1 tablet (81 mg total) by mouth daily.        Past Medical History:    Edema    Uterine cancer (HCC)      Past Surgical History:   Procedure Laterality Date    Cataract      Hysterectomy      Other surgical history  02/2024    botox procedure of bladder      Family History   Problem Relation Age of Onset    Other (tuberculosis) Father     Hypertension Mother     Alcohol abuse Mother     Hypertension Sister     Heart Attack Sister       Social History:   Social History     Socioeconomic History    Marital status: Unknown   Tobacco Use    Smoking status: Never     Passive exposure: Never    Smokeless tobacco: Never   Vaping Use    Vaping status: Never Used   Substance and Sexual Activity    Alcohol use: Not Currently    Drug use: Not Currently   Other Topics Concern    Reaction to local anesthetic No    Pt has a pacemaker No    Pt has a defibrillator No     Exercise: none.  Diet: doesn't watch     REVIEW OF SYSTEMS:     Constitutional Negative Chills, fatigue and  fever.   ENMT Negative Hearing loss, nasal drainage, pain in/around ear, sinus pressure and sore throat.   Eyes Negative Eye pain and vision changes.   Respiratory Negative Cough, dyspnea and wheezing.   Cardio Negative Chest pain, claudication, edema and irregular heartbeat/palpitations.   GI Negative Abdominal pain, blood in stool, constipation, diarrhea, heartburn, nausea and vomiting.    Negative Dysuria, hematuria, urinary incontinence. Menses regular, not heavy.   Endocrine Negative Cold intolerance and heat intolerance.   Neuro Negative Dizziness, extremity weakness, headache, memory impairment, numbness in extremities, seizures and tremors.   Psych Negative Anxiety, depression and insomnia.   Integumentary Negative Breast discharge, breast lump(s), hair loss and rash.   MS Negative Back pain and joint pain.   Hema/Lymph Negative Easy bleeding, easy bruising and thromboembolic events.   Allergic/Immuno Negative Environmental allergies, food allergies and seasonal allergies.         EXAM:   /67 (BP Location: Right arm, Patient Position: Sitting, Cuff Size: large)   Pulse 74   Temp 97.7 °F (36.5 °C) (Temporal)   Ht 5' 1\" (1.549 m)   Wt 182 lb (82.6 kg)   SpO2 97%   BMI 34.39 kg/m²   Body mass index is 34.39 kg/m².     Constitutional Normal Well developed.   Eyes Normal Pupil - Right: Normal, Left: Normal.   Ears Normal External - normal. Canal. TM - Normal bilaterally  Hearing - grossly normal   Nasopharynx Normal External nose - Normal. Lips/teeth/gums - Normal. Oropharynx - clear no erythema or exudate   Neck Exam Normal Palpation - Normal. No thyromegaly or lymphadenopathy   Breast Normal Inspection, palpation, nipples normal bilaterally. Self breast exam has been taught. Patient performs self breast exam.   Respiratory Normal Auscultation - Normal, no wheezes or rales   Cardiovascular Normal Regular rate and rhythm. No murmurs, gallops, or rubs   Vascular Normal Pulses - Dorsalis pedis:  Normal. Bruits - Carotids: Absent.    Extremity Normal No edema. No varicosities.   Abdomen Normal Inspection normal, BS active. No abdominal tenderness or masses palpable   Musculoskeletal Normal Overview - Normal. No swelling or deformities.   Skin Normal Inspection - Normal.   Neurological Normal Memory - Normal. Sensory - Normal. Motor - Normal. Balance & gait - Normal.   Psychiatric Normal Orientation - Oriented to time, place, person & situation. Appropriate mood and affect.          ASSESSMENT AND PLAN:   Ivis Pablo is a 89 year old female who presents for a complete physical exam.  Self breast exam explained.   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  The patient indicates understanding of these issues and agrees to the plan.  The patient is asked to return for annual physical in 1 year.

## 2024-09-12 ENCOUNTER — HOSPITAL ENCOUNTER (OUTPATIENT)
Dept: GENERAL RADIOLOGY | Facility: HOSPITAL | Age: 89
Discharge: HOME | End: 2024-09-12
Attending: OTOLARYNGOLOGY
Payer: COMMERCIAL

## 2024-09-12 DIAGNOSIS — R13.10 DYSPHAGIA, UNSPECIFIED TYPE: ICD-10-CM

## 2024-09-12 PROCEDURE — 74246 X-RAY XM UPR GI TRC 2CNTRST: CPT | Performed by: OTOLARYNGOLOGY

## 2024-09-12 PROCEDURE — 74230 X-RAY XM SWLNG FUNCJ C+: CPT | Performed by: OTOLARYNGOLOGY

## 2024-09-12 PROCEDURE — 92611 MOTION FLUOROSCOPY/SWALLOW: CPT

## 2024-09-12 NOTE — PROGRESS NOTES
ADULT VIDEOFLUOROSCOPIC SWALLOWING STUDY       ADULT VIDEOFLUOROSCOPIC SWALLOWING STUDY:   Referring Physician: Terell      Radiologist: Dr. Ivey  Diagnosis: dysphagia    Date of Service: 9/12/2024     PATIENT SUMMARY   Chief Complaint: The patient was diagnosed with oral thrush in February.  She saw two ENTs and finally was prescribed medication which initially eliminated the thrush, but it has since returned with small lesions on her posterior tongue. She developed a bitter taste in her mouth.  She woke one morning and was unable to swallow in that the food would stick in her throat and wouldn't go down.  Eventually she was able to swallow tiny bits of liquids.  She has lost almost 20 pound in the past 7 months, but has started to gain a little of it back.  She has occasional shortness of breath on exertion.  Currently, she denies odynophagia.  Current Diet: regular foods and liquids       Problem List  Active Problems:  Active Problems:    * No active hospital problems. *      Past Medical History  Past Medical History:    Edema    Uterine cancer (HCC)        Imaging results: 8/30/24 CT chest:  No evidence of pulmonary embolism to the bilateral segmental arterial level. Small hiatal hernia.     ASSESSMENT   DYSPHAGIA ASSESSMENT  Test completed in conjunction with Radiologist.   Food/Liquid Types Presented: puree, solid, and thin liquids.    Study Position and View:  Patient was seated upright and viewed laterally.  AP was not viewed as esophagram/UGI was completed immediately prior to this exam.    Pain Assessment: The patient reports pain at a level of 0/10.    Oral phase:  Bilabial seal was intact with no anterior food or liquid loss.  Adequate bolus formation and containment.  Piecemeal deglutition noted with puree and solids in that the patient propelled small amounts into the pharynx, swallowing 3-4 times per bolus.  When cued to swallow all at once the patient was able to do so.  Mild oral residue  remained.    Pharyngeal phase:  The pharyngeal response triggered at the tongue base to valleculae for puree and solids and at the mid pharynx for thin liquids due to delay.  Delayed epiglottic inversion resulted in transient laryngeal penetration with thin liquids.  All penetration appeared to eject from the airway with the completion of the swallow, however, cannot rule out trace amounts occasionally remaining in the laryngeal vestibule.  No aspiration was identified.  Base of tongue retraction and hyolaryngeal excursion were functional.  Mild vallecular retention remained with liquids and puree, but no significant retention with solids.      Esophageal phase:   Narrowing at the cricopharyngeus segment with bar like anterior protrusion.  No material was retained above this area.    Penetration Aspiration Scale: 2/8.  Material entered the airway, remained above the vocal cords and was ejected from the airway.    Overall Impression: Minimal oropharyngeal swallow with piecemeal swallows with puree and transient laryngeal penetration with thin liquids.  In addition, a narrowing was noted at the cricopharyngeus.  Pt reported she swallowed in a piecemeal fashion because she is concerned the food won't go down.  Recommend general to soft, easy to chew foods and regular liquids using strategies outlined below. Dysphagia therapy is not warranted.  Pt may benefit from GI consult.    FCM category and level: Swallowing, 6  PLAN   Potential: Good    Diet Recommendations:  Solids:  Soft easy to chew solids, IDDSI 6-7  Liquids: Thin, IDDSI 0    Recommended compensatory strategies:   Sit upright  Small bites  Eat slowly  Alternate liquids and solids    Medication Administration:  No restrictions    Further Follow-up:  No follow up with this service.  Follow up with Dr. Figueredo.      EDUCATION/INSTRUCTION  Reviewed results and recommendations with patient.  Written instructions were provided.  Agreement/Understanding verbalized and  all questions answered to their apparent satisfaction.      INTERDISCIPLINARY COMMUNICATION  Reviewed results with Radiologist; agreement verbalized.      Thank you for your referral. Please co-sign or sign and return this letter via fax as soon as possible. If you have any questions, please contact me at [phone].    Luciana Mann MA/EDEN-SLP  Speech Language Pathologist  [facility]  [phone]    Electronically signed by therapist: Luciana Mann, SLP  Physician's certification required: No  Y

## 2024-09-12 NOTE — PATIENT INSTRUCTIONS
VIDEO SWALLOW STUDY    Diet Recommendations:  Solids: Soft easy to chew solids, IDDSI 6-7  Liquids: Thin, IDDSI 0    Recommended compensatory strategies:   Sit upright  Small bites  Eat slowly  Alternate liquids and solids    Medication Administration:  No restrictions    Further Follow-up:  No follow up with this service.  Follow up with Dr. Figueredo.    Luciana Mann MA/[facility]-SLP  Speech Language Pathologist  [facility]  [phone]

## 2024-09-17 ENCOUNTER — OFFICE VISIT (OUTPATIENT)
Dept: OTOLARYNGOLOGY | Facility: CLINIC | Age: 89
End: 2024-09-17

## 2024-09-17 DIAGNOSIS — R13.10 DYSPHAGIA, UNSPECIFIED TYPE: Primary | ICD-10-CM

## 2024-09-17 PROCEDURE — 99214 OFFICE O/P EST MOD 30 MIN: CPT | Performed by: OTOLARYNGOLOGY

## 2024-09-17 NOTE — PROGRESS NOTES
Ivis Pablo is a 89 year old female.    Chief Complaint   Patient presents with    Throat Problem     Patient is here due to swallowing issues.  Reports this mostly in the morning Reports bitterness taste on tongue.        HISTORY OF PRESENT ILLNESS  States that she was seen by Dr. Dominguez 6 months ago at [facility] and diagnosed with oral thrush but given Biotene mouthwash to use with no medications.  Seen by another doctor in February at [facility] and told that she had thrush and started on an antithrush medication.  No improvement her symptoms.  Symptoms are wax and wane and more recently she has been having severe throat discomfort to the point where she has not been able to actually eat food or liquids very readily since 3 days.  Seen by Dr. Pacheco and sent to my office for my opinion regarding her chronic mouth discomfort.  She states that her mouth is full of ulcers and this is much worse than usual.  Prescribed some type of medication by her physician which was not available to her.  On no other meds at this time.  No other signs, symptoms or complaints     7/5/24 last visit started on prednisone burst and taper as well as hydrocortisone/Benadryl elixir.  Was not dispensed the elixir by her [facility] pharmacy.  She states that within 3 days she was able to eat and feels very good at this point feeling that she is essentially back to normal.  No complaints or concerns even feels that her tongue has improved in terms of its appearance on the surface.  No other signs, symptoms or complaints.  Etiology of her problem unclear at this time.     7/23/24 she has been using diphenhydramine/hydrocortisone for her oral lesions and feels that they have all resolved at this time.  She states that she was doing very well up until last week when she awoke and suddenly noted that she could not frantic and started trying to pass warm liquids which she states tends to open things up and allow her to swallow  a little bit better.  Has a hard time swallowing hard foods and even liquids seem to get hung up at times.  No other signs, symptoms or complaints she seems to be able to tolerate her own saliva.     9/17/24 here to go over the results of her esophagram and video swallow.  Esophagram demonstrates some penetration without aspiration.  Also essentially normal esophageal function noted.  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  No benefit from dysphagia therapy according to speech therapist.  Continues to have same symptoms of swallowing difficulty.  Having some swelling to her lower extremities bilaterally will be seeing a cardiologist in 3 days      Social History     Socioeconomic History    Marital status: Unknown   Tobacco Use    Smoking status: Never     Passive exposure: Never    Smokeless tobacco: Never   Vaping Use    Vaping status: Never Used   Substance and Sexual Activity    Alcohol use: Not Currently    Drug use: Not Currently   Other Topics Concern    Reaction to local anesthetic No    Pt has a pacemaker No    Pt has a defibrillator No       Family History   Problem Relation Age of Onset    Other (tuberculosis) Father     Hypertension Mother     Alcohol abuse Mother     Hypertension Sister     Heart Attack Sister        Past Medical History:    Edema    Uterine cancer (HCC)       Past Surgical History:   Procedure Laterality Date    Cataract      Hysterectomy      Other surgical history  02/2024    botox procedure of bladder         REVIEW OF SYSTEMS    System Neg/Pos Details   Constitutional Negative Fatigue, fever and weight loss.   ENMT Negative Drooling.   Eyes Negative Blurred vision and vision changes.   Respiratory Negative Dyspnea and wheezing.   Cardio Negative Chest pain, irregular heartbeat/palpitations and syncope.   GI Negative Abdominal pain and  diarrhea.   Endocrine Negative Cold intolerance and heat intolerance.   Neuro Negative Tremors.   Psych Negative Anxiety and depression.   Integumentary Negative Frequent skin infections, pigment change and rash.   Hema/Lymph Negative Easy bleeding and easy bruising.           PHYSICAL EXAM    There were no vitals taken for this visit.       Constitutional Normal Overall appearance - Normal.   Psychiatric Normal Orientation - Oriented to time, place, person & situation. Appropriate mood and affect.   Neck Exam Normal Inspection - Normal. Palpation - Normal. Parotid gland - Normal. Thyroid gland - Normal.   Eyes Normal Conjunctiva - Right: Normal, Left: Normal. Pupil - Right: Normal, Left: Normal. Fundus - Right: Normal, Left: Normal.   Neurological Normal Memory - Normal. Cranial nerves - Cranial nerves II through XII grossly intact.   Head/Face Normal Facial features - Normal. Eyebrows - Normal. Skull - Normal.        Nasopharynx Normal External nose - Normal. Lips/teeth/gums - Normal. Tonsils - Normal. Oropharynx - Normal.   Ears Normal Inspection - Right: Normal, Left: Normal. Canal - Right: Normal, Left: Normal. TM - Right: Normal, Left: Normal.   Skin Normal Inspection - Normal.        Lymph Detail Normal Submental. Submandibular. Anterior cervical. Posterior cervical. Supraclavicular.        Nose/Mouth/Throat Normal External nose - Normal. Lips/teeth/gums - Normal. Tonsils - Normal. Oropharynx - Normal.   Nose/Mouth/Throat Normal Nares - Right: Normal Left: Normal. Septum -Normal  Turbinates - Right: Normal, Left: Normal.       Current Outpatient Medications:     ketoconazole 2 % External Cream, Apply 1 Application topically 2 (two) times daily. Apply to  both feet, Disp: 60 g, Rfl: 3    ammonium lactate 12 % External Lotion, Apply 1 Application topically 2 (two) times daily., Disp: 400 g, Rfl: 11    furosemide 40 MG Oral Tab, Take 0.5 tablets (20 mg total) by mouth daily., Disp: 30 tablet, Rfl: 2    Potassium  Chloride ER 10 MEQ Oral Tab CR, Take 2 tablets (20 mEq total) by mouth daily., Disp: 30 tablet, Rfl: 2    pantoprazole (PROTONIX) 40 MG Oral Tab EC, Take 1 tablet (40 mg total) by mouth every morning before breakfast., Disp: 30 tablet, Rfl: 1    aspirin 81 MG Oral Tab EC, Take 1 tablet (81 mg total) by mouth daily., Disp: , Rfl:   ASSESSMENT AND PLAN    1. Dysphagia, unspecified type  Continues to have a sensation of food getting caught when she swallows.  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  This could explain her complaints.  She does have some penetration but no aspiration noted on the studies.  No dysphagia therapy recommended.  Speech pathologist did recommend a GI evaluation.  Currently having significant cardiac issues I suspect that she has lower limb edema bilaterally.  She will be seeing her cardiologist later this week.  I did ask her to simply contact our office when she feels that she is healthy enough to pursue a GI evaluation require an EGD and dilation under sedation at the least.  She will contact us when she is feeling better.  Greater than 30 minutes spent with patient in discussions regarding her studies.        This note was prepared using Dragon Medical voice recognition dictation software. As a result errors may occur. When identified these errors have been corrected. While every attempt is made to correct errors during dictation discrepancies may still exist    Leon Figueredo MD    9/17/2024    3:17 PM

## 2024-10-02 ENCOUNTER — NURSE TRIAGE (OUTPATIENT)
Dept: INTERNAL MEDICINE CLINIC | Facility: CLINIC | Age: 89
End: 2024-10-02

## 2024-10-02 NOTE — TELEPHONE ENCOUNTER
Action Requested: Summary for Provider     []  Critical Lab, Recommendations Needed  [] Need Additional Advice  [x]   FYI    []   Need Orders  [] Need Medications Sent to Pharmacy  []  Other     SUMMARY: Per protocol, patient should be seen in the office today. Requesting an appointment for tomorrow. Appointment scheduled.    Future Appointments   Date Time Provider Department Center   10/3/2024 11:45 AM Saw Pacheco MD [id number] [facility] 429   12/27/2024 11:40 AM [facility] DEXA RM1 [facility] DEXA EM [facility]   12/27/2024 12:00 PM [facility] DEMOND RM5 [facility] EM [facility]     Reason for call: Leg Pain  Onset: 8/2024    Patient reports she had surgery in the artery of the left leg back in August and was thought to have a blood clot. She had complains of left leg swelling and pain at that time. She was told there was no blood clot but has Baker's cyst.    States she remains to have swelling and pain in the left leg, swelling is from the left foot up to the knee, and pain up to the thigh area. Right leg seems to be swollen as well. States she has been taking diuretic for a long time which initially relieved the fluid buildup but now does not seem to be helping. Denies chest pain, shortness of breath or difficulty of breathing. She takes Tylenol for pain. She has upcoming appointments with cardiologist on 10/11, 10/17, and 10/25 for followup.    Advised to schedule an appointment for evaluation. She cannot come in today but able to tomorrow. Care advice given per protocol. Patient verbalized understanding and agreed with plan of care.    Reason for Disposition   Patient wants to be seen    Protocols used: Leg Swelling and Edema-A-OH

## 2024-10-03 ENCOUNTER — OFFICE VISIT (OUTPATIENT)
Dept: INTERNAL MEDICINE CLINIC | Facility: CLINIC | Age: 89
End: 2024-10-03

## 2024-10-03 VITALS
OXYGEN SATURATION: 96 % | TEMPERATURE: 98 F | WEIGHT: 186 LBS | RESPIRATION RATE: 17 BRPM | HEART RATE: 77 BPM | BODY MASS INDEX: 35.12 KG/M2 | HEIGHT: 61 IN | DIASTOLIC BLOOD PRESSURE: 64 MMHG | SYSTOLIC BLOOD PRESSURE: 134 MMHG

## 2024-10-03 DIAGNOSIS — G89.29 CHRONIC PAIN OF LEFT KNEE: ICD-10-CM

## 2024-10-03 DIAGNOSIS — B37.0 ORAL THRUSH: Primary | ICD-10-CM

## 2024-10-03 DIAGNOSIS — M71.22 BAKER CYST, LEFT: ICD-10-CM

## 2024-10-03 DIAGNOSIS — M25.562 CHRONIC PAIN OF LEFT KNEE: ICD-10-CM

## 2024-10-03 PROCEDURE — 90471 IMMUNIZATION ADMIN: CPT | Performed by: INTERNAL MEDICINE

## 2024-10-03 PROCEDURE — 90662 IIV NO PRSV INCREASED AG IM: CPT | Performed by: INTERNAL MEDICINE

## 2024-10-03 PROCEDURE — 99214 OFFICE O/P EST MOD 30 MIN: CPT | Performed by: INTERNAL MEDICINE

## 2024-10-03 RX ORDER — ACETAMINOPHEN AND CODEINE PHOSPHATE 300; 30 MG/1; MG/1
1 TABLET ORAL NIGHTLY PRN
Qty: 20 TABLET | Refills: 0 | Status: SHIPPED | OUTPATIENT
Start: 2024-10-03

## 2024-10-03 NOTE — PROGRESS NOTES
Subjective:   Patient ID: Ivis Pablo is a 89 year old female.    HPI  Patient comes in today with complaint of ongoing left knee left leg swelling she is seeing interventional radiology DVT was negative but there was a large Baker's cyst on the left knee she does have some varicose veins also continues to have pain and swelling.  Patient also with on and off oral thrush seen ENT before for the sore throat at that time she did not have the thrush so was not addressed but in the past has taken treatment which has gotten better but now the thrush is back  History/Other:   Review of Systems   Constitutional: Negative.    HENT: Negative.          Oral thrush   Eyes: Negative.    Respiratory: Negative.     Cardiovascular: Negative.    Gastrointestinal: Negative.    Genitourinary: Negative.    Musculoskeletal:  Positive for arthralgias.        Left knee pain and swelling   Skin: Negative.    Neurological: Negative.    Psychiatric/Behavioral: Negative.       Current Outpatient Medications   Medication Sig Dispense Refill    acetaminophen-codeine 300-30 MG Oral Tab Take 1 tablet by mouth nightly as needed for Pain. 20 tablet 0    ketoconazole 2 % External Cream Apply 1 Application topically 2 (two) times daily. Apply to  both feet 60 g 3    ammonium lactate 12 % External Lotion Apply 1 Application topically 2 (two) times daily. 400 g 11    furosemide 40 MG Oral Tab Take 0.5 tablets (20 mg total) by mouth daily. 30 tablet 2    Potassium Chloride ER 10 MEQ Oral Tab CR Take 2 tablets (20 mEq total) by mouth daily. 30 tablet 2    pantoprazole (PROTONIX) 40 MG Oral Tab EC Take 1 tablet (40 mg total) by mouth every morning before breakfast. 30 tablet 1    aspirin 81 MG Oral Tab EC Take 1 tablet (81 mg total) by mouth daily.       Allergies:  Allergies   Allergen Reactions    Penicillins HIVES, ITCHING, OTHER (SEE COMMENTS) and RASH     itching in abdominal area    itching in abdominal area   itching in abdominal area      Other  reaction(s): PRURITUS   itching in abdominal area    Erythromycin RASH       Objective:   Physical Exam  Vitals and nursing note reviewed.   Constitutional:       Appearance: She is well-developed.   HENT:      Head: Normocephalic and atraumatic.      Right Ear: External ear normal.      Left Ear: External ear normal.      Nose: Nose normal.   Eyes:      Conjunctiva/sclera: Conjunctivae normal.      Pupils: Pupils are equal, round, and reactive to light.   Cardiovascular:      Rate and Rhythm: Normal rate and regular rhythm.      Heart sounds: Normal heart sounds.   Pulmonary:      Effort: Pulmonary effort is normal.      Breath sounds: Normal breath sounds.   Abdominal:      General: Bowel sounds are normal.      Palpations: Abdomen is soft.   Genitourinary:     Vagina: Normal.   Musculoskeletal:         General: Tenderness present. Normal range of motion.      Cervical back: Normal range of motion and neck supple.      Comments: Left knee pain and swelling   Skin:     General: Skin is warm and dry.   Neurological:      Mental Status: She is alert and oriented to person, place, and time.      Deep Tendon Reflexes: Reflexes are normal and symmetric.   Psychiatric:         Behavior: Behavior normal.         Thought Content: Thought content normal.         Judgment: Judgment normal.         Assessment & Plan:   1. Oral thrush - follow with ent   2. Baker cyst, left - will refer to ortho   3. Chronic pain of left knee - as above as need med for pain , will give few tyl with codein to take as nee d       Orders Placed This Encounter   Procedures    INFLUENZA VAC HIGH DOSE PRSV FREE       Meds This Visit:  Requested Prescriptions     Signed Prescriptions Disp Refills    acetaminophen-codeine 300-30 MG Oral Tab 20 tablet 0     Sig: Take 1 tablet by mouth nightly as needed for Pain.       Imaging & Referrals:  ENT - INTERNAL  ORTHOPEDIC - INTERNAL  INFLUENZA VAC HIGH DOSE PRSV FREE

## 2024-10-04 ENCOUNTER — PATIENT OUTREACH (OUTPATIENT)
Dept: CASE MANAGEMENT | Age: 89
End: 2024-10-04

## 2024-10-04 NOTE — PROGRESS NOTES
The patient called CCM to ask a question about medication. This CCM answered the patient question about where acetaminophen-codeine 300-30 MG Oral Tab was sent.

## 2024-10-04 NOTE — PROGRESS NOTES
CCM informed patient had interest in CCM , patient does not currently qualify for CCM . Patient informed.

## 2024-10-07 ENCOUNTER — TELEPHONE (OUTPATIENT)
Dept: OTOLARYNGOLOGY | Facility: CLINIC | Age: 89
End: 2024-10-07

## 2024-10-09 ENCOUNTER — TELEPHONE (OUTPATIENT)
Dept: OTOLARYNGOLOGY | Facility: CLINIC | Age: 89
End: 2024-10-09

## 2024-10-09 NOTE — TELEPHONE ENCOUNTER
Called to remind Ivis that she has an appt tomorrow 10/10/24 at 2:10 with Dr. Figueredo, she understood and appreciated the reminder.

## 2024-11-01 ENCOUNTER — MED REC SCAN ONLY (OUTPATIENT)
Dept: INTERNAL MEDICINE CLINIC | Facility: CLINIC | Age: 89
End: 2024-11-01

## 2024-11-08 RX ORDER — PANTOPRAZOLE SODIUM 40 MG/1
40 TABLET, DELAYED RELEASE ORAL
Qty: 90 TABLET | Refills: 3 | Status: SHIPPED | OUTPATIENT
Start: 2024-11-08

## 2024-11-09 NOTE — TELEPHONE ENCOUNTER
Refill Passed Per Protocol    Requested Prescriptions   Pending Prescriptions Disp Refills    PANTOPRAZOLE 40 MG Oral Tab EC [Pharmacy Med Name: PANTOPRAZOLE 40MG TABLETS] 30 tablet 1     Sig: TAKE 1 TABLET(40 MG) BY MOUTH EVERY MORNING BEFORE BREAKFAST       Gastrointestional Medication Protocol Passed - 11/8/2024 11:47 PM        Passed - In person appointment or virtual visit in the past 12 mos or appointment in next 3 mos     Recent [facility]              [facility]    [facility]    1 month ago Dysphag[facility], unspecified type    [facility]    [facility]    2 months ago [facility] physical exam    [facility]    Office Visit    2 months ago Tinea pedis of left foot    [facility], Paulette Salinas MD    Office Visit    2 months ago Bitter taste    [facility]    [facility]          Future Appointments         Provider Department Appt Notes    In 1 month [address]     In 1 month [address] Patient can't get previous films will sign consent                         Future Appointments         Provider Department Appt Notes    In 1 month [address]     In 1 month [address] Patient can't get previous films will sign consent          Recent Outpatient Visits              1 month ago [facility]    [facility] Visit    1 month ago [facility]ys[facility], unspecified type    [facility]    [facility] Visit     2 months ago Annual physical exam    [facility], Aliza Payne MD    Office Visit    2 months ago Tinea pedis of left foot    [facility], Paulette Salinas MD    Office Visit    2 months ago Bitter taste    [facility], Saw Payne MD    Office Visit

## 2024-11-15 ENCOUNTER — NURSE TRIAGE (OUTPATIENT)
Dept: CASE MANAGEMENT | Age: 89
End: 2024-11-15

## 2024-11-15 NOTE — TELEPHONE ENCOUNTER
[facility] [employer] [facility]      Voicemail states that both of her feet are hurting (Gout) and wants to see Dr. Pacheco to get a referral for a podiatrist. Please call patient.

## 2024-11-15 NOTE — TELEPHONE ENCOUNTER
Action Requested: Summary for Provider     []  Critical Lab, Recommendations Needed  [] Need Additional Advice  []   FYI    []   Need Orders  [] Need Medications Sent to Pharmacy  [x]  Other     SUMMARY: Verified name and .    Patient reports pain in bilateral feet- left foot and big toe and right ankle- mentions some swelling- was told in the past that it was related to gout. She states she is able to walk with no issues.    Per protocol, appointment scheduled:  Future Appointments   Date Time Provider Department Center   2024 11:15 AM Saw Pacheco MD [id number] [facility] [place] 429   2024 11:40 AM [facility] DEXA RM1 [facility] DEXA EM [facility]   2024 12:00 PM [facility] DEMOND RM5 [facility] EM [facility]       Reason for call: Acute (Gout - Both feet hurt)  Onset: Data Unavailable            Reason for Disposition   Pain in the big toe joint    Protocols used: Foot Pain-A-OH

## 2024-11-19 ENCOUNTER — OFFICE VISIT (OUTPATIENT)
Dept: INTERNAL MEDICINE CLINIC | Facility: CLINIC | Age: 89
End: 2024-11-19

## 2024-11-19 VITALS
HEART RATE: 86 BPM | DIASTOLIC BLOOD PRESSURE: 82 MMHG | WEIGHT: 179 LBS | OXYGEN SATURATION: 98 % | SYSTOLIC BLOOD PRESSURE: 136 MMHG | BODY MASS INDEX: 33.79 KG/M2 | HEIGHT: 61 IN | RESPIRATION RATE: 17 BRPM | TEMPERATURE: 99 F

## 2024-11-19 DIAGNOSIS — M79.672 CHRONIC PAIN OF BOTH FEET: ICD-10-CM

## 2024-11-19 DIAGNOSIS — R09.81 SINUS CONGESTION: ICD-10-CM

## 2024-11-19 DIAGNOSIS — R03.0 ELEVATED BP WITHOUT DIAGNOSIS OF HYPERTENSION: ICD-10-CM

## 2024-11-19 DIAGNOSIS — M79.671 CHRONIC PAIN OF BOTH FEET: ICD-10-CM

## 2024-11-19 DIAGNOSIS — G89.29 CHRONIC PAIN OF BOTH FEET: ICD-10-CM

## 2024-11-19 DIAGNOSIS — J02.9 SORE THROAT: Primary | ICD-10-CM

## 2024-11-19 PROCEDURE — 99214 OFFICE O/P EST MOD 30 MIN: CPT | Performed by: INTERNAL MEDICINE

## 2024-11-19 NOTE — PROGRESS NOTES
Subjective:     Patient ID: Ivis Pablo is a 89 year old female.    HPI patient comes in today with complaint of ongoing bilateral feet pain this is a chronic problem but lately having more pain she has had surgery in the past seen different doctors.  Patient also today complains of some sore throat sinus congestion has been going on since the weekend no fever no chills    History/Other:   Review of Systems   Constitutional: Negative.    HENT:  Positive for congestion and sore throat.    Eyes: Negative.    Respiratory: Negative.     Cardiovascular: Negative.    Gastrointestinal: Negative.    Genitourinary: Negative.    Musculoskeletal: Negative.         Bl feet pain    Skin: Negative.    Neurological: Negative.    Psychiatric/Behavioral: Negative.       Current Outpatient Medications   Medication Sig Dispense Refill    pantoprazole 40 MG Oral Tab EC Take 1 tablet (40 mg total) by mouth before breakfast. 90 tablet 3    acetaminophen-codeine 300-30 MG Oral Tab Take 1 tablet by mouth nightly as needed for Pain. 20 tablet 0    ketoconazole 2 % External Cream Apply 1 Application topically 2 (two) times daily. Apply to  both feet 60 g 3    ammonium lactate 12 % External Lotion Apply 1 Application topically 2 (two) times daily. 400 g 11    furosemide 40 MG Oral Tab Take 0.5 tablets (20 mg total) by mouth daily. 30 tablet 2    Potassium Chloride ER 10 MEQ Oral Tab CR Take 2 tablets (20 mEq total) by mouth daily. 30 tablet 2    aspirin 81 MG Oral Tab EC Take 1 tablet (81 mg total) by mouth daily.       Allergies:Allergies[1]    Past Medical History:    Edema    Uterine cancer (HCC)      Past Surgical History:   Procedure Laterality Date    Cataract      Hysterectomy      Other surgical history  02/2024    botox procedure of bladder      Family History   Problem Relation Age of Onset    Other (tuberculosis) Father     Hypertension Mother     Alcohol abuse Mother     Hypertension Sister     Heart Attack Sister       Social  History:   Social History     Socioeconomic History    Marital status: Unknown   Tobacco Use    Smoking status: Never     Passive exposure: Never    Smokeless tobacco: Never   Vaping Use    Vaping status: Never Used   Substance and Sexual Activity    Alcohol use: Not Currently    Drug use: Not Currently   Other Topics Concern    Reaction to local anesthetic No    Pt has a pacemaker No    Pt has a defibrillator No        Objective:   Physical Exam  Vitals and nursing note reviewed.   Constitutional:       Appearance: She is well-developed.   HENT:      Head: Normocephalic and atraumatic.      Right Ear: External ear normal.      Left Ear: External ear normal.      Nose: Nose normal.   Eyes:      Conjunctiva/sclera: Conjunctivae normal.      Pupils: Pupils are equal, round, and reactive to light.   Cardiovascular:      Rate and Rhythm: Normal rate and regular rhythm.      Heart sounds: Normal heart sounds.   Pulmonary:      Effort: Pulmonary effort is normal.      Breath sounds: Normal breath sounds.   Abdominal:      General: Bowel sounds are normal.      Palpations: Abdomen is soft.   Genitourinary:     Vagina: Normal.   Musculoskeletal:         General: Tenderness present. Normal range of motion.      Cervical back: Normal range of motion and neck supple.      Comments: Bl feet pain    Skin:     General: Skin is warm and dry.   Neurological:      Mental Status: She is alert and oriented to person, place, and time.      Deep Tendon Reflexes: Reflexes are normal and symmetric.   Psychiatric:         Behavior: Behavior normal.         Thought Content: Thought content normal.         Judgment: Judgment normal.         Assessment & Plan:   1. Sore throat strep test negative COVID-negative take as needed medication for pain   2. Elevated BP without diagnosis of hypertension monitor blood pressure watch salt intake   3. Chronic pain of both feet will refer to podiatrist   4. Sinus congestion as above as needed medication  monitor symptoms any worsening symptoms let us know       Orders Placed This Encounter   Procedures    POC Rapid Strep [80017]    POC COVID19 BinaxNOW Antigen       Meds This Visit:  Requested Prescriptions      No prescriptions requested or ordered in this encounter       Imaging & Referrals:  PODIATRY - INTERNAL            [1]   Allergies  Allergen Reactions    Penicillins HIVES, ITCHING, OTHER (SEE COMMENTS) and RASH     itching in abdominal area    itching in abdominal area   itching in abdominal area      Other reaction(s): PRURITUS   itching in abdominal area    Erythromycin RASH

## 2024-12-31 NOTE — TELEPHONE ENCOUNTER
Please review. Protocol Failed; No Protocol    Requested Prescriptions   Pending Prescriptions Disp Refills    POTASSIUM CHLORIDE ER 10 MEQ Oral Tab CR [Pharmacy Med Name: POTASSIUM CL 10MEQ ER TABLETS] 30 tablet 2     Sig: TAKE 2 TABLETS(20 MEQ) BY MOUTH DAILY       There is no refill protocol information for this order            Future Appointments         Provider Department Appt Notes    In 3 weeks Ela Hanna DPM [facility]     In 3 months [facility] RM[facility] D[facility] qa jg    In 3 months [facility] DEMOND RM5 [facility] qa jg Patient can't get previous films will sign consent          Recent Outpatient Visits              1 month ago Sore throat    [facility] Saw Payne MD    Office Visit    2 months ago [facility]    [facility] Visit    3 months ago Dysphagia, unspecified type    [facility] Leon Chiu MD    Office Visit    3 months ago Annual physical exam    [facility]    [facility]    4 months ago [facility] left foot    [facility]    [facility] Visit

## 2025-01-01 RX ORDER — POTASSIUM CHLORIDE 750 MG/1
20 TABLET, EXTENDED RELEASE ORAL DAILY
Qty: 30 TABLET | Refills: 2 | Status: SHIPPED | OUTPATIENT
Start: 2025-01-01

## 2025-02-28 ENCOUNTER — MED REC SCAN ONLY (OUTPATIENT)
Dept: INTERNAL MEDICINE CLINIC | Facility: CLINIC | Age: 89
End: 2025-02-28

## 2025-03-12 ENCOUNTER — TELEPHONE (OUTPATIENT)
Dept: CASE MANAGEMENT | Age: 89
End: 2025-03-12

## 2025-03-12 DIAGNOSIS — M79.671 PAIN IN BOTH FEET: Primary | ICD-10-CM

## 2025-03-12 DIAGNOSIS — M79.672 PAIN IN BOTH FEET: Primary | ICD-10-CM

## 2025-03-12 NOTE — TELEPHONE ENCOUNTER
Dr. Pacheco,     Patient called requesting referral to Dr. Ela NEVAREZ, appointment 3/13/25.     Pended referral please review diagnosis and sign off if you agree.    Thank you.  Krystyna Sheikh  [facility]

## 2025-03-13 ENCOUNTER — OFFICE VISIT (OUTPATIENT)
Dept: PODIATRY CLINIC | Facility: CLINIC | Age: 89
End: 2025-03-13

## 2025-03-13 DIAGNOSIS — M25.571 PAIN IN JOINTS OF BOTH FEET: ICD-10-CM

## 2025-03-13 DIAGNOSIS — B35.1 ONYCHOMYCOSIS: Primary | ICD-10-CM

## 2025-03-13 DIAGNOSIS — M25.572 PAIN IN JOINTS OF BOTH FEET: ICD-10-CM

## 2025-03-13 PROCEDURE — 99203 OFFICE O/P NEW LOW 30 MIN: CPT | Performed by: STUDENT IN AN ORGANIZED HEALTH CARE EDUCATION/TRAINING PROGRAM

## 2025-03-13 NOTE — PROGRESS NOTES
[facility] Podiatry  Progress Note      Ivis Pablo is a 89 year old female.   Chief Complaint   Patient presents with    Consult     Pt states she has pain in both feet and discolored toenails             HPI:     Patient is a pleasant 89-year-old female presents to clinic for bilateral foot evaluation.  Patient admits to pain to bilateral foot and ankles.  Patient relates that she had a right great toenail removal as well as hammertoe corrections to the right foot.  Denies any pedal injuries or trauma.  Patient does take a diuretic for lower extremity edema.    Allergies: Penicillins and Erythromycin    Current Outpatient Medications   Medication Sig Dispense Refill    Potassium Chloride ER 10 MEQ Oral Tab CR Take 2 tablets (20 mEq total) by mouth daily. 30 tablet 2    pantoprazole 40 MG Oral Tab EC Take 1 tablet (40 mg total) by mouth before breakfast. 90 tablet 3    ketoconazole 2 % External Cream Apply 1 Application topically 2 (two) times daily. Apply to  both feet 60 g 3    ammonium lactate 12 % External Lotion Apply 1 Application topically 2 (two) times daily. 400 g 11    furosemide 40 MG Oral Tab Take 0.5 tablets (20 mg total) by mouth daily. 30 tablet 2    aspirin 81 MG Oral Tab EC Take 1 tablet (81 mg total) by mouth daily.      acetaminophen-codeine 300-30 MG Oral Tab Take 1 tablet by mouth nightly as needed for Pain. (Patient not taking: Reported on 3/13/2025) 20 tablet 0      Past Medical History:    Edema    Uterine cancer (HCC)      Past Surgical History:   Procedure Laterality Date    Cataract      Hysterectomy      Other surgical history  02/2024    botox procedure of bladder      Family History   Problem Relation Age of Onset    Other (tuberculosis) Father     Hypertension Mother     Alcohol abuse Mother     Hypertension Sister     Heart Attack Sister       Social History     Socioeconomic History    Marital status: Unknown   Tobacco Use    Smoking status: Never     Passive exposure: Never     Smokeless tobacco: Never   Vaping Use    Vaping status: Never Used   Substance and Sexual Activity    Alcohol use: Not Currently    Drug use: Not Currently   Other Topics Concern    Reaction to local anesthetic No    Pt has a pacemaker No    Pt has a defibrillator No           REVIEW OF SYSTEMS:     Denies nause, fever, chills  No calf pain  Denies chest pain or SOB      EXAM:   There were no vitals taken for this visit.  GENERAL: well developed, well nourished, in no apparent distress  EXTREMITIES:   1. Integument: Normal skin temperature and turgor.  Dystrophic toenails x 9.  Absent right hallux toenail  2. Vascular: Dorsalis pedis two out of four bilateral and posterior tibial pulses two out of   four bilateral, capillary refill normal.   3. Musculoskeletal: All muscle groups are graded 5 out of 5 in the foot and ankle.   4. Neurological: Normal sharp dull sensation; reflexes normal.             ASSESSMENT AND PLAN:   Diagnoses and all orders for this visit:    Onychomycosis    Pain in joints of both feet        Plan:   Patient seen and examined and findings discussed with patient.  Advised patient to let her toenails grow out and she could see us in 6 weeks for nail debridement.  Discussed with patient the majority of her symptoms are due to arthritic changes.  Since she is already taking meloxicam with no relief I recommend over-the-counter Voltaren gel to be used 3 times a day.  Avoid walking barefoot.  Take diuretic as instructed by her doctor for lower extremity edema control.  Return to clinic for nail care.    The patient indicates understanding of these issues and agrees to the plan.        Ela Hanna DPM

## 2025-03-28 ENCOUNTER — NURSE TRIAGE (OUTPATIENT)
Dept: INTERNAL MEDICINE CLINIC | Facility: CLINIC | Age: 89
End: 2025-03-28

## 2025-03-28 NOTE — TELEPHONE ENCOUNTER
Action Requested: Summary for Provider     []  Critical Lab, Recommendations Needed  [] Need Additional Advice  []   FYI    []   Need Orders  [] Need Medications Sent to Pharmacy  []  Other     SUMMARY: states her vision has been getting more blurry and she had pain in her left eye lastnight. Scheduled an appointment for next week.     Reason for call: Eye Problem (/)  Onset: several weeks    The patient called stating her vision is blurry and she is having pain in her left eye. She states she is worried that it could be due to high blood pressure, she is unable to check her blood pressure at home. She states her vision has been blurry for a while and she has been ignoring it and now her eye pain developed lastnight. She states she notices yesterday that her vision was more blurry then normal while she was driving. She is supposed to see an ophthalmologist on may 15th to address her cataracts, as the blurry vision is related to that. Advised she be seen in the ER or urgent care for this, she stated they do not do anything for her and she does not wish to go there as she is not having symptoms at this time. She then stated she had eye pain lastnight and now she is not, and her vision is more blurry then it normally is but it is not terrible. Scheduled her for an appointment next week.     Future Appointments   Date Time Provider Department [place]   3/31/2025  3:00 PM Kyle Burch MD ECCFHENT EC [facility]   3/31/2025  3:20 PM EC AUDIO ECCFHAUD EC [facility]   4/1/2025  2:15 PM Saw Pacheco MD [id number] EC [place] [facility]   4/18/2025  1:20 PM [facility] DEXA RM1 [facility] DEXA EM [facility]   4/18/2025  2:00 PM CFH DEMOND RM5 CF DEMOND EM [facility]         Reason for Disposition   Patient wants to be seen   MILD eye pains are a recurrent problem    Protocols used: Vision Loss or Change-A-OH, Eye Pain and Other Symptoms-A-[place]

## 2025-03-31 ENCOUNTER — OFFICE VISIT (OUTPATIENT)
Dept: OTOLARYNGOLOGY | Facility: CLINIC | Age: 89
End: 2025-03-31

## 2025-03-31 ENCOUNTER — OFFICE VISIT (OUTPATIENT)
Dept: AUDIOLOGY | Facility: CLINIC | Age: 89
End: 2025-03-31

## 2025-03-31 VITALS — WEIGHT: 180 LBS | BODY MASS INDEX: 33.99 KG/M2 | HEIGHT: 61 IN

## 2025-03-31 DIAGNOSIS — H91.90 HEARING LOSS, UNSPECIFIED HEARING LOSS TYPE, UNSPECIFIED LATERALITY: Primary | ICD-10-CM

## 2025-03-31 DIAGNOSIS — H61.23 BILATERAL IMPACTED CERUMEN: ICD-10-CM

## 2025-03-31 DIAGNOSIS — R42 DIZZINESS: ICD-10-CM

## 2025-03-31 DIAGNOSIS — H90.3 SENSORINEURAL HEARING LOSS, BILATERAL: Primary | ICD-10-CM

## 2025-03-31 DIAGNOSIS — H90.3 SENSORINEURAL HEARING LOSS (SNHL) OF BOTH EARS: ICD-10-CM

## 2025-03-31 DIAGNOSIS — M26.609 TMJ (TEMPOROMANDIBULAR JOINT DISORDER): ICD-10-CM

## 2025-03-31 PROCEDURE — 92557 COMPREHENSIVE HEARING TEST: CPT | Performed by: AUDIOLOGIST

## 2025-03-31 NOTE — PROGRESS NOTES
Ivis Pablo is a 89 year old female.   Chief Complaint   Patient presents with    New Patient    Hearing Loss     Difficulty hearing     HPI:   89-year-old presents with multiple complaints including left ear pain, dizziness and difficulty hearing    Current Outpatient Medications   Medication Sig Dispense Refill    Potassium Chloride ER 10 MEQ Oral Tab CR Take 2 tablets (20 mEq total) by mouth daily. 30 tablet 2    pantoprazole 40 MG Oral Tab EC Take 1 tablet (40 mg total) by mouth before breakfast. 90 tablet 3    ketoconazole 2 % External Cream Apply 1 Application topically 2 (two) times daily. Apply to  both feet 60 g 3    ammonium lactate 12 % External Lotion Apply 1 Application topically 2 (two) times daily. 400 g 11    furosemide 40 MG Oral Tab Take 0.5 tablets (20 mg total) by mouth daily. 30 tablet 2    aspirin 81 MG Oral Tab EC Take 1 tablet (81 mg total) by mouth daily.      acetaminophen-codeine 300-30 MG Oral Tab Take 1 tablet by mouth nightly as needed for Pain. (Patient not taking: Reported on 3/31/2025) 20 tablet 0      Past Medical History:    Edema    Uterine cancer (HCC)      Social History:  Social History     Socioeconomic History    Marital status: Unknown   Tobacco Use    Smoking status: Never     Passive exposure: Never    Smokeless tobacco: Never   Vaping Use    Vaping status: Never Used   Substance and Sexual Activity    Alcohol use: Not Currently    Drug use: Not Currently   Other Topics Concern    Reaction to local anesthetic No    Pt has a pacemaker No    Pt has a defibrillator No      Past Surgical History:   Procedure Laterality Date    Cataract      Hysterectomy      Other surgical history  02/2024    botox procedure of bladder         EXAM:   Ht 5' 1\" (1.549 m)   Wt 180 lb (81.6 kg)   BMI 34.01 kg/m²     System Details   Skin Inspection - Normal.   Constitutional Overall appearance - Normal.   Head/Face Symmetric, TMJ tenderness on the left present    Eyes EOMI, PERRL   Right ear:   Canal with cerumen, TM intact, no JOEL   Left ear:  Canal with cerumen, TM intact, no JOEL   Nose: Septum midline, inferior turbinates not enlarged, nasal valves without collapse    Oral cavity/Oropharynx: No lesions or masses on inspection or palpation, tonsils symmetric    Neck: Soft without LAD, thyroid not enlarged  Voice clear/ no stridor   Other:      SCOPES AND PROCEDURES:     Canals:  Left: Canal with cerumen preventing adequate view of TM, debrided with instrumentation  Right: Canal with cerumen preventing adequate view of TM, debrided with instrumentation    Tympanic Membranes:  Left: Normal tympanic membrane.   Right: Normal tympanic membrane.     TM Visualized Method:   Left TM examined via otomicroscopy.    Right TM examined via otomicroscopy.      PROCEDURE:   Removal of cerumen impaction   The cerumen impaction was completely removed on the left and right sides using microscopy as necessary.   Removal was completed by using a curette and suction.     AUDIOGRAM AND IMAGING:         IMPRESSION:   1. Hearing loss, unspecified hearing loss type, unspecified laterality    2. Dizziness  - PHYSICAL THERAPY - INTERNAL    3. TMJ (temporomandibular joint disorder)    4. Sensorineural hearing loss (SNHL) of both ears    5. Bilateral impacted cerumen       Recommendations:  -Cerumen cleared from her ears otherwise no abnormalities.  Suspect a chronic TMJ disorder leading to this left ear pain  -She has moderate presbycusis in each ear.  She has some difficulty maintaining her hearing aids  -Will refer to physical therapy and gave her some home balance exercises with regards to her dizziness    The patient indicates understanding of these issues and agrees to the plan.      Kyle Burch MD  3/31/2025  3:50 PM

## 2025-04-24 ENCOUNTER — MED REC SCAN ONLY (OUTPATIENT)
Dept: INTERNAL MEDICINE CLINIC | Facility: CLINIC | Age: 89
End: 2025-04-24

## 2025-05-21 ENCOUNTER — OFFICE VISIT (OUTPATIENT)
Dept: INTERNAL MEDICINE CLINIC | Facility: CLINIC | Age: 89
End: 2025-05-21

## 2025-05-21 VITALS
DIASTOLIC BLOOD PRESSURE: 85 MMHG | SYSTOLIC BLOOD PRESSURE: 135 MMHG | TEMPERATURE: 98 F | BODY MASS INDEX: 33.99 KG/M2 | WEIGHT: 180 LBS | OXYGEN SATURATION: 98 % | HEART RATE: 67 BPM | HEIGHT: 61 IN

## 2025-05-21 DIAGNOSIS — R35.0 URINE FREQUENCY: ICD-10-CM

## 2025-05-21 DIAGNOSIS — D50.8 OTHER IRON DEFICIENCY ANEMIA: ICD-10-CM

## 2025-05-21 DIAGNOSIS — R35.0 URINARY FREQUENCY: ICD-10-CM

## 2025-05-21 DIAGNOSIS — R53.83 FATIGUE, UNSPECIFIED TYPE: Primary | ICD-10-CM

## 2025-05-21 LAB
APPEARANCE: CLEAR
BASOPHILS # BLD AUTO: 0.01 X10(3) UL (ref 0–0.2)
BASOPHILS NFR BLD AUTO: 0.2 %
BILIRUBIN: NEGATIVE
DEPRECATED RDW RBC AUTO: 54.3 FL (ref 35.1–46.3)
EOSINOPHIL # BLD AUTO: 0.06 X10(3) UL (ref 0–0.7)
EOSINOPHIL NFR BLD AUTO: 1.1 %
ERYTHROCYTE [DISTWIDTH] IN BLOOD BY AUTOMATED COUNT: 17.2 % (ref 11–15)
GLUCOSE (URINE DIPSTICK): NEGATIVE MG/DL
HCT VFR BLD AUTO: 34 % (ref 35–48)
HGB BLD-MCNC: 11 G/DL (ref 12–16)
IMM GRANULOCYTES # BLD AUTO: 0.03 X10(3) UL (ref 0–1)
IMM GRANULOCYTES NFR BLD: 0.6 %
KETONES (URINE DIPSTICK): NEGATIVE MG/DL
LEUKOCYTES: NEGATIVE
LYMPHOCYTES # BLD AUTO: 1.4 X10(3) UL (ref 1–4)
LYMPHOCYTES NFR BLD AUTO: 25.7 %
MCH RBC QN AUTO: 28.1 PG (ref 26–34)
MCHC RBC AUTO-ENTMCNC: 32.4 G/DL (ref 31–37)
MCV RBC AUTO: 87 FL (ref 80–100)
MONOCYTES # BLD AUTO: 0.63 X10(3) UL (ref 0.1–1)
MONOCYTES NFR BLD AUTO: 11.6 %
NEUTROPHILS # BLD AUTO: 3.31 X10 (3) UL (ref 1.5–7.7)
NEUTROPHILS # BLD AUTO: 3.31 X10(3) UL (ref 1.5–7.7)
NEUTROPHILS NFR BLD AUTO: 60.8 %
NITRITE, URINE: NEGATIVE
OCCULT BLOOD: NEGATIVE
PH, URINE: 6.5 (ref 4.5–8)
PLATELET # BLD AUTO: 557 10(3)UL (ref 150–450)
PROTEIN (URINE DIPSTICK): NEGATIVE MG/DL
RBC # BLD AUTO: 3.91 X10(6)UL (ref 3.8–5.3)
SPECIFIC GRAVITY: 1.02 (ref 1–1.03)
TSI SER-ACNC: 2.75 UIU/ML (ref 0.55–4.78)
UROBILINOGEN,SEMI-QN: 0.2 MG/DL (ref 0–1.9)
VIT B12 SERPL-MCNC: 1128 PG/ML (ref 211–911)
WBC # BLD AUTO: 5.4 X10(3) UL (ref 4–11)

## 2025-05-21 PROCEDURE — 81000 URINALYSIS NONAUTO W/SCOPE: CPT | Performed by: INTERNAL MEDICINE

## 2025-05-21 PROCEDURE — 99214 OFFICE O/P EST MOD 30 MIN: CPT | Performed by: INTERNAL MEDICINE

## 2025-05-21 PROCEDURE — 3075F SYST BP GE 130 - 139MM HG: CPT | Performed by: INTERNAL MEDICINE

## 2025-05-21 PROCEDURE — 3079F DIAST BP 80-89 MM HG: CPT | Performed by: INTERNAL MEDICINE

## 2025-05-21 PROCEDURE — 36415 COLL VENOUS BLD VENIPUNCTURE: CPT | Performed by: INTERNAL MEDICINE

## 2025-05-21 PROCEDURE — 3008F BODY MASS INDEX DOCD: CPT | Performed by: INTERNAL MEDICINE

## 2025-05-22 LAB — DEPRECATED HBV CORE AB SER IA-ACNC: 141 NG/ML (ref 50–306)

## 2025-05-27 ENCOUNTER — TELEPHONE (OUTPATIENT)
Dept: INTERNAL MEDICINE CLINIC | Facility: CLINIC | Age: 89
End: 2025-05-27

## 2025-05-27 NOTE — TELEPHONE ENCOUNTER
Patient called asking if oxybutynin can be prescribed to her   She does not have an appointment with the urogynecologist until June 27th.  Patient states she still  has been up every 2 hours urinating  She denies burning with urination or odor to urine. Please advise.

## 2025-05-27 NOTE — TELEPHONE ENCOUNTER
Patient advised of 's notes and verbalized understanding. Patient already has an appointment with urogynecology June 27th.

## 2025-05-29 ENCOUNTER — MED REC SCAN ONLY (OUTPATIENT)
Dept: INTERNAL MEDICINE CLINIC | Facility: CLINIC | Age: 89
End: 2025-05-29

## 2025-06-02 ENCOUNTER — OFFICE VISIT (OUTPATIENT)
Dept: PODIATRY CLINIC | Facility: CLINIC | Age: 89
End: 2025-06-02

## 2025-06-02 DIAGNOSIS — M79.674 PAIN DUE TO ONYCHOMYCOSIS OF TOENAILS OF BOTH FEET: ICD-10-CM

## 2025-06-02 DIAGNOSIS — B35.1 PAIN DUE TO ONYCHOMYCOSIS OF TOENAILS OF BOTH FEET: ICD-10-CM

## 2025-06-02 DIAGNOSIS — M79.675 PAIN DUE TO ONYCHOMYCOSIS OF TOENAILS OF BOTH FEET: ICD-10-CM

## 2025-06-02 DIAGNOSIS — B35.1 ONYCHOMYCOSIS: Primary | ICD-10-CM

## 2025-06-02 PROCEDURE — 99214 OFFICE O/P EST MOD 30 MIN: CPT | Performed by: PODIATRIST

## 2025-06-02 RX ORDER — FOLIC ACID 1 MG/1
TABLET ORAL
COMMUNITY
Start: 2024-09-20

## 2025-06-02 RX ORDER — SOY PROTEIN
POWDER (GRAM) ORAL
COMMUNITY
Start: 2024-10-25

## 2025-06-02 NOTE — PROGRESS NOTES
Reason for Visit      Ivis Pablo is a 89 year old female presents today complaining of bilateral routine footcare.     History of Present Illness     Patient presents to clinic today for routine footcare.  Patient saw podiatry a few months ago and still has pain to the distal aspects of both of her big toes.  Patient states she has had multiple surgeries in her feet and is not interested in any invasive procedure at this time.    The following portions of the patient's history were reviewed and updated as appropriate: allergies, current medications, past family history, past medical history, past social history, past surgical history and problem list.    Allergies[1]    Medications - Current[2]    Medications Discontinued During This Encounter   Medication Reason    acetaminophen-codeine 300-30 MG Oral Tab Therapy completed    acetaminophen-codeine 300-30 MG Oral Tab Therapy completed    ammonium lactate 12 % External Lotion Therapy completed    ketoconazole 2 % External Cream Patient discontinued       Problem List[3]    Past Medical History[4]    Past Surgical History[5]    Family History[6]    Social History     Occupational History    Not on file   Tobacco Use    Smoking status: Never     Passive exposure: Never    Smokeless tobacco: Never   Vaping Use    Vaping status: Never Used   Substance and Sexual Activity    Alcohol use: Not Currently    Drug use: Not Currently    Sexual activity: Not on file       ROS      Constitutional: negative for chills, fevers and sweats  Gastrointestinal: negative for abdominal pain, diarrhea, nausea and vomiting  Genitourinary:negative for dysuria and hematuria  Musculoskeletal:negative for arthralgias and muscle weakness  Neurological: negative for paresthesia and weakness  All others reviewed and negative.      Physical Exam     LE PHYSICAL EXAM    Constitution: Well-developed and well-nourished. Gait appears normal. No apparent distress. Alert and oriented to person, place,  and time.  Integument: There are no varicosities. Skin appears moist, warm, and supple with positive hair growth. There are no color changes. No open lesions. No macerations, No Hyperkeratotic lesions.  Vascular examination: Dorsalis pedis and posterior tibial pulses are strong bilaterally with capillary filling time less than 3 seconds to all digits. There is no peripheral edema..  Neurological Sensorium: Grossly intact to sharp/dull. Vibratory: Intact.  Musculoskeletal:   5/5 pedal muscle strength b/l       Advanced trophic changes as: hair growth (decrease or absence) nail changes  (thickening) pigmentary changes (discoloration) skin texture (thin, shiny)        Vitals: There were no vitals taken for this visit.    Procedure       Nails 1 through 5 bilateral debrided with use of a nail nipper.  Patient Toller procedure well had no complications.  Neurovascular status intact to the level of the digits post procedure.   Assessment and Plan     Encounter Diagnoses   Name Primary?    Onychomycosis Yes    Pain due to onychomycosis of toenails of both feet    Patient was educated on the etiology and treatment options for onychomycosis. Both topical, oral, laser, and nail removal were explained in great detail.     Patient was instructed to call the office or on-call podiatric physician immediately with any issues or concerns before the next scheduled visit. Patient to follow-up in clinic in as needed      Juli Fernandes DPM, [email or web address], JINA  Diplomat, American Board of Foot and Ankle Surgery  Certified in Foot and Rearfoot/Ankle Reconstruction  Fellow of the American College of Foot and Ankle Surgeons  Fellowship Trained Foot and Ankle Surgeon   [facility]     6/2/2025    3:24 PM         [1]   Allergies  Allergen Reactions    Penicillins HIVES, ITCHING, OTHER (SEE COMMENTS) and RASH     itching in abdominal area    itching in abdominal area   itching in abdominal area      Other reaction(s):  PRURITUS   itching in abdominal area    Erythromycin RASH   [2]   Current Outpatient Medications:     Cobalamin Combinations (VITAMIN B12-FOLIC ACID) 500-400 MCG Oral Tab, vitamin B12 500 mcg-folic acid 400 mcg tablet, [RxNorm: 496295], Disp: , Rfl:     folic acid 1 MG Oral Tab, folic acid 1 mg tablet, [RxNorm: 055148], Disp: , Rfl:     oxybutynin 5 MG Oral Tab, Take 1 tablet (5 mg total) by mouth 2 (two) times daily., Disp: 180 tablet, Rfl: 0    Potassium Chloride ER 10 MEQ Oral Tab CR, Take 2 tablets (20 mEq total) by mouth daily., Disp: 30 tablet, Rfl: 2    pantoprazole 40 MG Oral Tab EC, Take 1 tablet (40 mg total) by mouth before breakfast., Disp: 90 tablet, Rfl: 3    furosemide 40 MG Oral Tab, Take 0.5 tablets (20 mg total) by mouth daily., Disp: 30 tablet, Rfl: 2    aspirin 81 MG Oral Tab EC, Take 1 tablet (81 mg total) by mouth daily., Disp: , Rfl:   [3]   Patient Active Problem List  Diagnosis    Abnormal cardiovascular stress test    Abnormal computed tomography scan    Atrophy of mandible    Bilateral lower extremity edema    Bitter taste    Brown hairy tongue    Carotid atherosclerosis    Diverticulosis of sigmoid colon    Edema    Elevated platelet count    Hemoptysis    Hypertension    Impaired glucose tolerance    Increased frequency of urination    Left ventricular hypertrophy    Lung mass    Mass of mandible    Neoplasm of floor of mouth    OAB (overactive bladder)    Pain of upper abdomen    Serum creatinine raised    Snores    Stenosis of carotid artery    Stress at home    Tubular adenoma of colon    Urge incontinence   [4]   Past Medical History:   Edema    Uterine cancer (HCC)   [5]   Past Surgical History:  Procedure Laterality Date    Cataract      Hysterectomy      Other surgical history  02/2024    botox procedure of bladder   [6]   Family History  Problem Relation Age of Onset    Other (tuberculosis) Father     Hypertension Mother     Alcohol abuse Mother     Hypertension Sister     Heart  Attack Sister

## 2025-06-12 ENCOUNTER — NURSE TRIAGE (OUTPATIENT)
Dept: INTERNAL MEDICINE CLINIC | Facility: CLINIC | Age: 89
End: 2025-06-12

## 2025-06-12 ENCOUNTER — OFFICE VISIT (OUTPATIENT)
Dept: INTERNAL MEDICINE CLINIC | Facility: CLINIC | Age: 89
End: 2025-06-12

## 2025-06-12 VITALS
RESPIRATION RATE: 17 BRPM | TEMPERATURE: 98 F | BODY MASS INDEX: 31.72 KG/M2 | SYSTOLIC BLOOD PRESSURE: 138 MMHG | HEIGHT: 61 IN | OXYGEN SATURATION: 96 % | DIASTOLIC BLOOD PRESSURE: 70 MMHG | HEART RATE: 82 BPM | WEIGHT: 168 LBS

## 2025-06-12 DIAGNOSIS — R06.2 WHEEZING: ICD-10-CM

## 2025-06-12 DIAGNOSIS — R05.2 SUBACUTE COUGH: ICD-10-CM

## 2025-06-12 DIAGNOSIS — R09.89 CHEST CONGESTION: Primary | ICD-10-CM

## 2025-06-12 PROCEDURE — 3008F BODY MASS INDEX DOCD: CPT | Performed by: INTERNAL MEDICINE

## 2025-06-12 PROCEDURE — 3075F SYST BP GE 130 - 139MM HG: CPT | Performed by: INTERNAL MEDICINE

## 2025-06-12 PROCEDURE — 99214 OFFICE O/P EST MOD 30 MIN: CPT | Performed by: INTERNAL MEDICINE

## 2025-06-12 PROCEDURE — 3078F DIAST BP <80 MM HG: CPT | Performed by: INTERNAL MEDICINE

## 2025-06-12 RX ORDER — BENZONATATE 100 MG/1
100 CAPSULE ORAL 3 TIMES DAILY PRN
Qty: 20 CAPSULE | Refills: 0 | Status: SHIPPED | OUTPATIENT
Start: 2025-06-12 | End: 2025-06-19

## 2025-06-12 RX ORDER — DOXYCYCLINE 100 MG/1
100 CAPSULE ORAL 2 TIMES DAILY
Qty: 14 CAPSULE | Refills: 0 | Status: SHIPPED | OUTPATIENT
Start: 2025-06-12 | End: 2025-06-19

## 2025-06-12 NOTE — TELEPHONE ENCOUNTER
Patient was called and inform of Dr. Pacheco message below. Patient ageed to see Dr. Tara Latif at the [facility] location.        Future Appointments   Date Time Provider Department Center   6/12/2025  3:15 PM Saw Pacheco MD [id number] [place] [id number]

## 2025-06-12 NOTE — TELEPHONE ENCOUNTER
Action Requested: Summary for Provider     []  Critical Lab, Recommendations Needed  [x] Need Additional Advice  []   FYI    []   Need Orders  [] Need Medications Sent to Pharmacy  []  Other     SUMMARY: Patient wants to see Dr. Pacheco today. Patient has had a cold since Sunday. Patient states she thought she would start to feel better.  Patient has tried otc, lemon and honey remedies, no improvement.  Patient reports cough, congestion. No fever. Patient concerned about he breathing. Denies chest pain. Patient was not short of breath while on the call.  Patient informed Dr. Pacheco is at the [place] location today and fully booked.  Offered appointment with another provider.  Patient only wants Dr. Pacheco, but cannot drive to [place].  Dr. Pacheco is not available tomorrow.  Please advise.    Reason for call: Cold (Cough, congestion, )  Onset: Data Unavailable                     Reason for Disposition   Patient wants to be seen    Protocols used: Common Cold-A-[place]

## 2025-06-12 NOTE — PROGRESS NOTES
Subjective:     Patient ID: Ivis Pablo is a 89 year old female.    HPI    Patient comes in with complaint of cough chest congestion some wheezing has been going on for a week now had mild sore throat that is resolved feeling tired      History/Other:   Review of Systems   Constitutional: Negative.    HENT:  Positive for congestion.    Eyes: Negative.    Respiratory:  Positive for cough and wheezing.    Cardiovascular: Negative.    Gastrointestinal: Negative.    Genitourinary: Negative.    Musculoskeletal: Negative.    Skin: Negative.    Neurological: Negative.    Psychiatric/Behavioral: Negative.       Current Medications[1]  Allergies:Allergies[2]    Past Medical History[3]   Past Surgical History[4]   Family History[5]   Social History: Short Social Hx on File[6]     Objective:   Physical Exam  Vitals and nursing note reviewed.   Constitutional:       Appearance: She is well-developed.   HENT:      Head: Normocephalic and atraumatic.      Right Ear: External ear normal.      Left Ear: External ear normal.      Nose: Nose normal.   Eyes:      Conjunctiva/sclera: Conjunctivae normal.      Pupils: Pupils are equal, round, and reactive to light.   Cardiovascular:      Rate and Rhythm: Normal rate and regular rhythm.      Heart sounds: Normal heart sounds.   Pulmonary:      Effort: Pulmonary effort is normal.      Breath sounds: Wheezing present.      Comments: Mild bl wheezing   Abdominal:      General: Bowel sounds are normal.      Palpations: Abdomen is soft.   Genitourinary:     Vagina: Normal.   Musculoskeletal:         General: Normal range of motion.      Cervical back: Normal range of motion and neck supple.   Skin:     General: Skin is warm and dry.   Neurological:      Mental Status: She is alert and oriented to person, place, and time.      Deep Tendon Reflexes: Reflexes are normal and symmetric.   Psychiatric:         Behavior: Behavior normal.         Thought Content: Thought content normal.          Judgment: Judgment normal.         Assessment & Plan:   1. Chest congestion will cover with antibiotic and cough medicine monitor symptoms let us know if not better   2. Subacute cough as above   3. Wheezing as above let us know if not better or any worsening symptoms no need for inhaler at this time vital stable but if not better may need to get a chest x-ray and reexamined patient aware       No orders of the defined types were placed in this encounter.      Meds This Visit:  Requested Prescriptions     Signed Prescriptions Disp Refills    doxycycline 100 MG Oral Cap 14 capsule 0     Sig: Take 1 capsule (100 mg total) by mouth 2 (two) times daily for 7 days.    benzonatate 100 MG Oral Cap 20 capsule 0     Sig: Take 1 capsule (100 mg total) by mouth 3 (three) times daily as needed for cough.       Imaging & Referrals:  None            [1]   Current Outpatient Medications   Medication Sig Dispense Refill    doxycycline 100 MG Oral Cap Take 1 capsule (100 mg total) by mouth 2 (two) times daily for 7 days. 14 capsule 0    benzonatate 100 MG Oral Cap Take 1 capsule (100 mg total) by mouth 3 (three) times daily as needed for cough. 20 capsule 0    Cobalamin Combinations (VITAMIN B12-FOLIC ACID) 500-400 MCG Oral Tab vitamin B12 500 mcg-folic acid 400 mcg tablet, [RxNorm: 535789]      folic acid 1 MG Oral Tab folic acid 1 mg tablet, [RxNorm: 619899]      oxybutynin 5 MG Oral Tab Take 1 tablet (5 mg total) by mouth 2 (two) times daily. 180 tablet 0    Potassium Chloride ER 10 MEQ Oral Tab CR Take 2 tablets (20 mEq total) by mouth daily. 30 tablet 2    pantoprazole 40 MG Oral Tab EC Take 1 tablet (40 mg total) by mouth before breakfast. 90 tablet 3    furosemide 40 MG Oral Tab Take 0.5 tablets (20 mg total) by mouth daily. 30 tablet 2    aspirin 81 MG Oral Tab EC Take 1 tablet (81 mg total) by mouth daily.     [2]   Allergies  Allergen Reactions    Penicillins HIVES, ITCHING, OTHER (SEE COMMENTS) and RASH     itching in  abdominal area    itching in abdominal area   itching in abdominal area      Other reaction(s): PRURITUS   itching in abdominal area    Erythromycin RASH   [3]   Past Medical History:   Edema    Uterine cancer (HCC)   [4]   Past Surgical History:  Procedure Laterality Date    Cataract      Hysterectomy      Other surgical history  02/2024    botox procedure of bladder   [5]   Family History  Problem Relation Age of Onset    Other (tuberculosis) Father     Hypertension Mother     Alcohol abuse Mother     Hypertension Sister     Heart Attack Sister    [6]   Social History  Socioeconomic History    Marital status: Unknown   Tobacco Use    Smoking status: Never     Passive exposure: Never    Smokeless tobacco: Never   Vaping Use    Vaping status: Never Used   Substance and Sexual Activity    Alcohol use: Not Currently    Drug use: Not Currently   Other Topics Concern    Reaction to local anesthetic No    Pt has a pacemaker No    Pt has a defibrillator No

## 2025-06-12 NOTE — TELEPHONE ENCOUNTER
She needs to be seen.  It is okay to schedulewith any other provider closer to her, since she cannot drive to [employer] .  [facility]

## 2025-06-27 ENCOUNTER — OFFICE VISIT (OUTPATIENT)
Dept: UROLOGY | Facility: HOSPITAL | Age: 89
End: 2025-06-27
Attending: OBSTETRICS & GYNECOLOGY
Payer: COMMERCIAL

## 2025-06-27 VITALS — HEIGHT: 61 IN | RESPIRATION RATE: 18 BRPM | BODY MASS INDEX: 31.72 KG/M2 | WEIGHT: 168 LBS

## 2025-06-27 DIAGNOSIS — R39.14 FEELING OF INCOMPLETE BLADDER EMPTYING: ICD-10-CM

## 2025-06-27 DIAGNOSIS — R35.0 URINARY FREQUENCY: Primary | ICD-10-CM

## 2025-06-27 DIAGNOSIS — N95.2 POSTMENOPAUSAL ATROPHIC VAGINITIS: ICD-10-CM

## 2025-06-27 DIAGNOSIS — N81.84 PELVIC MUSCLE WASTING: ICD-10-CM

## 2025-06-27 DIAGNOSIS — R35.1 NOCTURIA: ICD-10-CM

## 2025-06-27 LAB
BLOOD URINE: NEGATIVE
CONTROL RUN WITHIN 24 HOURS?: YES
LEUKOCYTE ESTERASE URINE: NEGATIVE
NITRITE URINE: NEGATIVE

## 2025-06-27 PROCEDURE — 51701 INSERT BLADDER CATHETER: CPT | Performed by: OBSTETRICS & GYNECOLOGY

## 2025-06-27 PROCEDURE — 99212 OFFICE O/P EST SF 10 MIN: CPT

## 2025-06-27 PROCEDURE — 81002 URINALYSIS NONAUTO W/O SCOPE: CPT | Performed by: OBSTETRICS & GYNECOLOGY

## 2025-06-27 PROCEDURE — 87086 URINE CULTURE/COLONY COUNT: CPT | Performed by: OBSTETRICS & GYNECOLOGY

## 2025-06-27 RX ORDER — ESTRADIOL 0.1 MG/G
CREAM VAGINAL
Qty: 42 G | Refills: 3 | Status: SHIPPED | OUTPATIENT
Start: 2025-06-27

## 2025-06-27 NOTE — PROGRESS NOTES
Osiris Shah, DO  2025     Referred by Dr. Pacheco  Pt here with self    Chief Complaint   Patient presents with    Urinary Frequency       HPI:  No ALTAF  +UUI  She c/o urinary frequency    Nocturia x>8+  Denies prolapse sx  Reports sense of incomplete bladder emptying    Not sexually active, n/a dyspareunia  Reg bowels    PRIOR TREATMENTS:    Kegels    no UTIs  No gross hematuria    , vdx6    Hyst for endometrial ca    Has seen CLAUDETTE Valentine  Has tried botox 100U  (temporarily helpful)  Failed multiple meds  Mirabegron, increased BP  Toviaz 4mg  Vesicare 5mg  Detrol  Oxybutynin 10mg (dry mouth SE)    No clear h/o UDS testing    Vitals:  Resp 18   Ht 61\"   Wt 168 lb (76.2 kg)   BMI 31.74 kg/m²      HISTORY:  Past Medical History[1]   Past Surgical History[2]   Family History[3]   Short Social Hx on File[4]     Allergies:  Allergies[5]    Medications:  Medications Prior to Visit[6]    Urogynecology Summary:  Urogynecology Summary  Prolapse: No  ALTAF: No  Urge Incontinence: Yes  Nocturia Frequency: Greater than 4 (8+ x/noc, every 15-30 mins)  Frequency: Less than 1 hour  Incomplete emptying: Yes  Constipation: No  Wears pad day?: 5  Wears Pad Night?: 5  Activities are limited by UI/POP?: Yes  Currently Sexually Active: No  Avoids sexual activity due to: Other    Review of Systems:    A comprehensive 12 point review of systems was completed.  Pertinent positives noted in the the HPI.  No CP  No SOB    GENERAL EXAM:  GENERAL:  Alert and Oriented, and NAD  HEENT:  Normal, no lesions  LUNGS:  Normal effort  HEART:  RRR  ABDOMEN: soft, no mass, no hernia  EXTREM:  Normal, no edema  SKIN:  Normal, no lesions    PELVIC EXAM:  Ext. Gen: +atrophy, no lesions  Urethra: +atrophy, nontender  Bladder:+fullness, nontender  Vagina: +atrophy  Cervix & Uterus: absent  Adnexa:no masses, nontender  Perineum: nontender  Anus: defer  Rectum: defer  PELVIS FLOOR NEUROMUSCULAR FUNCTION:  Strength:  1 and Unable to hold greater  than 3 sec  Perineal Sensation:  Normal      PELVIC SUPPORT:  [place]:  0  Ant:  0  Post:  1  CST:  negative  UVJ: somewhat hypermobile    Verbal consent obtained  Sterile technique used to empty bladder, specimen sent      Pt examined with chaperone, RN    Impression/Plan:    ICD-10-CM    1. Urinary frequency  R35.0 POCT urinalysis dipstick[74148]     Urine Culture, Routine     Straight Cath PVR      2. Nocturia  R35.1 POCT urinalysis dipstick[[id number]]     Urine Culture, Routine     Straight Cath PVR      3. Pelvic muscle wasting  N81.84       4. Feeling of incomplete bladder emptying  R39.14       5. Postmenopausal atrophic vaginitis  N95.2 estradiol (ESTRACE) 0.1 MG/GM Vaginal Cream          Discussion Items:   Urodynamics and cystoscopy for evaluation of LUTS  Behavioral and pharmacologic treatments for OAB  Pelvic muscle rehabilitation including pelvic floor PT  Topical estrogen therapy for treating UGA  Discussed dietary and behavioral modification, discussed pharmacologic and nonpharmacologic mgmt options for urinary symptoms. Discussed dietary & weight management with potential improvements in symptoms with weight loss.    Discussed dietary and behavioral modifications for mgmt of urinary symptoms  Discussed weight management and benefits of weight loss on urinary symptoms  Reviewed AUA/SUFU guidelines on mgmt of non-neurogenic OAB  Discussed pharmacologic and nonpharmacologic mgmt options of urinary symptoms - reviewed risks, benefits, alternatives, and goals of treatment  Discussed specific risks related to OAB meds including, but not limited to dry mouth, constipation, blurry vision, cognitive changes, and BP elevation.    Diagnostic Items:  Urodynamics  Urine testing    Medications Discussed:  Estrace Cream  OAB Meds (gemtesa)    Treatment Plan, Non-surgical:   RN teaching/pt education done    Discussed mgmt of vulvovaginal atrophy with vaginal estrogen cream. Reviewed associated benefits, risks,  alternatives, and goals. Recommend low dose twice weekly mgmt     Treatment Plan, Surgical:   None    Pt verbalizes understanding of all above discussed information. All questions answered. She agrees to plan    Return for UDS, f/u.    Osiris Shah DO, FACOG, FACS      Discussion undertaken in English, info provided      The 21st Century Cures Act makes medical notes like these available to patients in the interest of transparency. However, be advised this is a medical document. It is intended as peer to peer communication. It is written in medical language and may contain abbreviations or verbiage that are unfamiliar. It may appear blunt or direct. Medical documents are intended to carry relevant information, facts as evident, and the clinical opinion of the practitioner.          [1]   Past Medical History:   Edema    Uterine cancer (HCC)   [2]   Past Surgical History:  Procedure Laterality Date    Cataract      Hysterectomy      Other surgical history  02/2024    botox procedure of bladder   [3]   Family History  Problem Relation Age of Onset    Other (tuberculosis) Father     Hypertension Mother     Alcohol abuse Mother     Hypertension Sister     Heart Attack Sister    [4]   Social History  Socioeconomic History    Marital status: Unknown   Tobacco Use    Smoking status: Never     Passive exposure: Never    Smokeless tobacco: Never   Vaping Use    Vaping status: Never Used   Substance and Sexual Activity    Alcohol use: Not Currently    Drug use: Not Currently   Other Topics Concern    Reaction to local anesthetic No    Pt has a pacemaker No    Pt has a defibrillator No   [5]   Allergies  Allergen Reactions    Penicillins HIVES, ITCHING, OTHER (SEE COMMENTS) and RASH     itching in abdominal area    itching in abdominal area   itching in abdominal area      Other reaction(s): PRURITUS   itching in abdominal area    Erythromycin RASH   [6]   Outpatient Medications Prior to Visit   Medication Sig Dispense  Refill    Cobalamin Combinations (VITAMIN B12-FOLIC ACID) 500-400 MCG Oral Tab vitamin B12 500 mcg-folic acid 400 mcg tablet, [RxNorm: 521513]      folic acid 1 MG Oral Tab folic acid 1 mg tablet, [RxNorm: 362219]      Potassium Chloride ER 10 MEQ Oral Tab CR Take 2 tablets (20 mEq total) by mouth daily. 30 tablet 2    pantoprazole 40 MG Oral Tab EC Take 1 tablet (40 mg total) by mouth before breakfast. 90 tablet 3    aspirin 81 MG Oral Tab EC Take 1 tablet (81 mg total) by mouth in the morning.      oxybutynin 5 MG Oral Tab Take 1 tablet (5 mg total) by mouth 2 (two) times daily. (Patient not taking: Reported on 6/27/2025) 180 tablet 0    furosemide 40 MG Oral Tab Take 0.5 tablets (20 mg total) by mouth daily. (Patient not taking: Reported on 6/27/2025) 30 tablet 2     No facility-administered medications prior to visit.

## 2025-06-30 ENCOUNTER — TELEPHONE (OUTPATIENT)
Dept: UROLOGY | Facility: HOSPITAL | Age: 89
End: 2025-06-30

## 2025-06-30 ENCOUNTER — NURSE TRIAGE (OUTPATIENT)
Dept: INTERNAL MEDICINE CLINIC | Facility: CLINIC | Age: 89
End: 2025-06-30

## 2025-06-30 NOTE — TELEPHONE ENCOUNTER
Action Requested: Summary for Provider     []  Critical Lab, Recommendations Needed  [] Need Additional Advice  []   FYI    []   Need Orders  [] Need Medications Sent to Pharmacy  []  Other     SUMMARY: states she has been having increased urinary frequency, this has been an ongoing issue. She is not having any other symptoms. She is not getting any sleep. Would like to be seen but cannot go to the [facility]. Scheduled an appointment to be seen at the [facility].     Reason for call: Urinary Frequency  Onset: several weeks    The patient called saying she has been urinating very often, she is not getting any sleep at all. She is not having any other symptoms, she is only having urinary frequency. She would like to be  seen urgently for this, she already saw her urologist. She said she tried to take medication for this but it did not help her at all. She is not able to go to the [place] office as she does not know where it is - she can only go to the [facility]. She also refuses to go to the urgent care or ER. Scheduled an appointment to be seen at the [facility] for this.     Future Appointments   Date Time Provider Department [place]   7/2/2025  3:30 PM Aliza Pacheco MD [id number] [place] [id number]   7/17/2025 12:00 PM [facility] PROCEDURE UROG EM [facility]   7/23/2025 11:00 AM Osiris Shah DO UROG EM [facility]   8/1/2025 11:00 AM [facility] DEXA RM1 [facility] DEXA EM [facility]   8/1/2025 11:40 AM [facility] DEMOND RM1 [facility] DEMOND EM [facility]       Reason for Disposition   Patient wants to be seen    Protocols used: Urinary Symptoms-A-[place]

## 2025-06-30 NOTE — TELEPHONE ENCOUNTER
Outgoing call to Ivis to inform her that the urine culture from 6/27/25 showed no growth.  No treatment is necessary.  All questions answered. Pt verbalized understanding.

## 2025-07-02 ENCOUNTER — OFFICE VISIT (OUTPATIENT)
Dept: INTERNAL MEDICINE CLINIC | Facility: CLINIC | Age: 89
End: 2025-07-02

## 2025-07-02 VITALS
BODY MASS INDEX: 31.34 KG/M2 | DIASTOLIC BLOOD PRESSURE: 85 MMHG | OXYGEN SATURATION: 98 % | WEIGHT: 166 LBS | HEIGHT: 61 IN | SYSTOLIC BLOOD PRESSURE: 135 MMHG | HEART RATE: 84 BPM | RESPIRATION RATE: 20 BRPM | TEMPERATURE: 98 F

## 2025-07-02 DIAGNOSIS — R63.4 WEIGHT LOSS: ICD-10-CM

## 2025-07-02 DIAGNOSIS — D75.839 THROMBOCYTOSIS: ICD-10-CM

## 2025-07-02 DIAGNOSIS — R35.1 NOCTURIA: Primary | ICD-10-CM

## 2025-07-02 LAB
ALBUMIN SERPL-MCNC: 4.5 G/DL (ref 3.2–4.8)
ALBUMIN/GLOB SERPL: 1.9 {RATIO} (ref 1–2)
ALP LIVER SERPL-CCNC: 52 U/L (ref 55–142)
ALT SERPL-CCNC: 16 U/L (ref 10–49)
ANION GAP SERPL CALC-SCNC: 6 MMOL/L (ref 0–18)
APPEARANCE: CLEAR
AST SERPL-CCNC: 32 U/L (ref ?–34)
BILIRUB SERPL-MCNC: 0.5 MG/DL (ref 0.2–1.1)
BILIRUBIN: NEGATIVE
BUN BLD-MCNC: 13 MG/DL (ref 9–23)
BUN/CREAT SERPL: 13.3 (ref 10–20)
CALCIUM BLD-MCNC: 10 MG/DL (ref 8.7–10.4)
CHLORIDE SERPL-SCNC: 107 MMOL/L (ref 98–112)
CO2 SERPL-SCNC: 27 MMOL/L (ref 21–32)
CREAT BLD-MCNC: 0.98 MG/DL (ref 0.55–1.02)
EGFRCR SERPLBLD CKD-EPI 2021: 56 ML/MIN/1.73M2 (ref 60–?)
FASTING STATUS PATIENT QL REPORTED: YES
GLOBULIN PLAS-MCNC: 2.4 G/DL (ref 2–3.5)
GLUCOSE (URINE DIPSTICK): NEGATIVE MG/DL
GLUCOSE BLD-MCNC: 95 MG/DL (ref 70–99)
KETONES (URINE DIPSTICK): NEGATIVE MG/DL
LEUKOCYTES: NEGATIVE
MULTISTIX LOT#: NORMAL NUMERIC
NITRITE, URINE: NEGATIVE
OCCULT BLOOD: NEGATIVE
OSMOLALITY SERPL CALC.SUM OF ELEC: 290 MOSM/KG (ref 275–295)
PH, URINE: 5.5 (ref 4.5–8)
POTASSIUM SERPL-SCNC: 5 MMOL/L (ref 3.5–5.1)
PROT SERPL-MCNC: 6.9 G/DL (ref 5.7–8.2)
PROTEIN (URINE DIPSTICK): NEGATIVE MG/DL
SODIUM SERPL-SCNC: 140 MMOL/L (ref 136–145)
SPECIFIC GRAVITY: >=1.03 (ref 1–1.03)
TSI SER-ACNC: 3.03 UIU/ML (ref 0.55–4.78)
URINE-COLOR: YELLOW
UROBILINOGEN,SEMI-QN: 0.2 MG/DL (ref 0–1.9)

## 2025-07-02 PROCEDURE — 3075F SYST BP GE 130 - 139MM HG: CPT | Performed by: INTERNAL MEDICINE

## 2025-07-02 PROCEDURE — 99214 OFFICE O/P EST MOD 30 MIN: CPT | Performed by: INTERNAL MEDICINE

## 2025-07-02 PROCEDURE — 3008F BODY MASS INDEX DOCD: CPT | Performed by: INTERNAL MEDICINE

## 2025-07-02 PROCEDURE — 3079F DIAST BP 80-89 MM HG: CPT | Performed by: INTERNAL MEDICINE

## 2025-07-02 PROCEDURE — 36415 COLL VENOUS BLD VENIPUNCTURE: CPT | Performed by: INTERNAL MEDICINE

## 2025-07-02 PROCEDURE — 81002 URINALYSIS NONAUTO W/O SCOPE: CPT | Performed by: INTERNAL MEDICINE

## 2025-07-02 RX ORDER — TRAZODONE HYDROCHLORIDE 50 MG/1
50 TABLET ORAL NIGHTLY PRN
Qty: 90 TABLET | Refills: 0 | Status: SHIPPED | OUTPATIENT
Start: 2025-07-02

## 2025-07-02 NOTE — PROGRESS NOTES
Subjective:     Patient ID: Ivis Pablo is a 89 year old female.    Urinary Frequency   Associated symptoms include frequency.   Anxiety        History/Other: She came in today for follow-up she is very anxious because of her frequent urination this is ongoing problem for long time.  She states that at night all night she is in the bathroom.  She tried medication in the past she stopped taking because she did not feel how they made her feel.  She was seen by urogynecology recently who prescribed estrogen cream and plan is for cystoscopy.  She just picked up vaginal cream.  She states that she is losing weight because of her  Her being constantly using washroom and urinating  But makes her anxious and she worries she denies any nausea or vomiting she denies any blood in the urine  She has trouble sleeping at night she wants medication  Review of Systems   Constitutional: Negative.    HENT: Negative.     Eyes: Negative.    Respiratory: Negative.     Cardiovascular: Negative.    Gastrointestinal: Negative.    Endocrine: Negative.    Genitourinary:  Positive for frequency.   Musculoskeletal: Negative.    Skin: Negative.    Neurological: Negative.    Hematological: Negative.    Psychiatric/Behavioral: Negative.       Current Medications[1]  Allergies:Allergies[2]    Past Medical History[3]   Past Surgical History[4]   Family History[5]   Social History: Short Social Hx on File[6]     Objective:   Physical Exam  Vitals and nursing note reviewed.   Constitutional:       Appearance: Normal appearance.   HENT:      Head: Normocephalic and atraumatic.   Cardiovascular:      Rate and Rhythm: Normal rate and regular rhythm.      Pulses: Normal pulses.      Heart sounds: Normal heart sounds.   Pulmonary:      Effort: Pulmonary effort is normal.      Breath sounds: Normal breath sounds.   Abdominal:      Palpations: Abdomen is soft.   Musculoskeletal:         General: Normal range of motion.      Cervical back: Normal range of  motion and neck supple.   Skin:     General: Skin is warm and dry.   Neurological:      Mental Status: She is alert. Mental status is at baseline.         Assessment & Plan:   No diagnosis found.  Frequent urination urogynecology note reviewed plan for cystoscopy  Anxiety due to above, discussed with her how to cope with stress and anxiety will start on trazodone due to her insomnia    Weight loss monitor weight at home encouraged her to eat follow-up in 1 month with weight readings, will do blood work,  Thrombocytosis - recheck cbc  No orders of the defined types were placed in this encounter.      Meds This Visit:  Requested Prescriptions      No prescriptions requested or ordered in this encounter       Imaging & Referrals:  None            [1]   Current Outpatient Medications   Medication Sig Dispense Refill    Cobalamin Combinations (VITAMIN B12-FOLIC ACID) 500-400 MCG Oral Tab vitamin B12 500 mcg-folic acid 400 mcg tablet, [RxNorm: 294785]      folic acid 1 MG Oral Tab folic acid 1 mg tablet, [RxNorm: 613541]      Potassium Chloride ER 10 MEQ Oral Tab CR Take 2 tablets (20 mEq total) by mouth daily. 30 tablet 2    pantoprazole 40 MG Oral Tab EC Take 1 tablet (40 mg total) by mouth before breakfast. 90 tablet 3    aspirin 81 MG Oral Tab EC Take 1 tablet (81 mg total) by mouth in the morning.      estradiol (ESTRACE) 0.1 MG/GM Vaginal Cream Apply 1/2 gram vaginally 2 times per week. 42 g 3    oxybutynin 5 MG Oral Tab Take 1 tablet (5 mg total) by mouth 2 (two) times daily. (Patient not taking: Reported on 7/2/2025) 180 tablet 0    furosemide 40 MG Oral Tab Take 0.5 tablets (20 mg total) by mouth daily. (Patient not taking: Reported on 7/2/2025) 30 tablet 2   [2]   Allergies  Allergen Reactions    Penicillins HIVES, ITCHING, OTHER (SEE COMMENTS) and RASH     itching in abdominal area    itching in abdominal area   itching in abdominal area      Other reaction(s): PRURITUS   itching in abdominal area     Erythromycin RASH   [3]   Past Medical History:   Edema    Uterine cancer (HCC)   [4]   Past Surgical History:  Procedure Laterality Date    Cataract      Hysterectomy      Other surgical history  02/2024    botox procedure of bladder   [5]   Family History  Problem Relation Age of Onset    Other (tuberculosis) Father     Hypertension Mother     Alcohol abuse Mother     Hypertension Sister     Heart Attack Sister    [6]   Social History  Socioeconomic History    Marital status: Unknown   Tobacco Use    Smoking status: Never     Passive exposure: Never    Smokeless tobacco: Never   Vaping Use    Vaping status: Never Used   Substance and Sexual Activity    Alcohol use: Not Currently    Drug use: Not Currently   Other Topics Concern    Reaction to local anesthetic No    Pt has a pacemaker No    Pt has a defibrillator No

## 2025-07-04 ENCOUNTER — LAB ENCOUNTER (OUTPATIENT)
Dept: LAB | Facility: HOSPITAL | Age: 89
End: 2025-07-04
Attending: INTERNAL MEDICINE
Payer: COMMERCIAL

## 2025-07-04 DIAGNOSIS — D50.8 OTHER IRON DEFICIENCY ANEMIA: ICD-10-CM

## 2025-07-04 LAB
IRON SATN MFR SERPL: 34 % (ref 15–50)
IRON SERPL-MCNC: 82 UG/DL (ref 50–170)
TOTAL IRON BINDING CAPACITY: 239 UG/DL (ref 250–425)
TRANSFERRIN SERPL-MCNC: 175 MG/DL (ref 250–380)

## 2025-07-04 PROCEDURE — 84466 ASSAY OF TRANSFERRIN: CPT

## 2025-07-04 PROCEDURE — 36415 COLL VENOUS BLD VENIPUNCTURE: CPT

## 2025-07-04 PROCEDURE — 83540 ASSAY OF IRON: CPT

## 2025-07-07 ENCOUNTER — TELEPHONE (OUTPATIENT)
Dept: UROLOGY | Facility: HOSPITAL | Age: 89
End: 2025-07-07

## 2025-07-07 NOTE — TELEPHONE ENCOUNTER
Outgoing telephone call to patient.   Patient scheduled for uds testing on 07/17/25.  Confirmed with patient she is not taking an OAB medication at this time.   Patient confirmed the above.   Patient verbalized understanding.   Encouraged to call with questions or concerns.

## 2025-07-14 ENCOUNTER — MED REC SCAN ONLY (OUTPATIENT)
Dept: INTERNAL MEDICINE CLINIC | Facility: CLINIC | Age: 89
End: 2025-07-14

## 2025-07-14 RX ORDER — BENZONATATE 100 MG/1
100 CAPSULE ORAL 3 TIMES DAILY PRN
Qty: 20 CAPSULE | Refills: 0 | OUTPATIENT
Start: 2025-07-14

## 2025-07-17 ENCOUNTER — OFFICE VISIT (OUTPATIENT)
Dept: UROLOGY | Facility: HOSPITAL | Age: 89
End: 2025-07-17
Attending: OBSTETRICS & GYNECOLOGY
Payer: COMMERCIAL

## 2025-07-17 VITALS — RESPIRATION RATE: 16 BRPM | HEIGHT: 61 IN | BODY MASS INDEX: 31.34 KG/M2 | WEIGHT: 166 LBS

## 2025-07-17 DIAGNOSIS — R35.0 URINARY FREQUENCY: Primary | ICD-10-CM

## 2025-07-17 DIAGNOSIS — R39.14 FEELING OF INCOMPLETE BLADDER EMPTYING: ICD-10-CM

## 2025-07-17 DIAGNOSIS — R35.1 NOCTURIA: ICD-10-CM

## 2025-07-17 PROCEDURE — 51797 INTRAABDOMINAL PRESSURE TEST: CPT

## 2025-07-17 PROCEDURE — 51784 ANAL/URINARY MUSCLE STUDY: CPT

## 2025-07-17 PROCEDURE — 51741 ELECTRO-UROFLOWMETRY FIRST: CPT

## 2025-07-17 PROCEDURE — 51729 CYSTOMETROGRAM W/VP&UP: CPT

## 2025-07-17 PROCEDURE — 81002 URINALYSIS NONAUTO W/O SCOPE: CPT

## 2025-07-17 NOTE — PATIENT INSTRUCTIONS
[facility] - [facility] UROGYNECOLOGY  [address]  [facility] [address]  PH: [phone]  FAX: [phone]       Urodynamic Testing Discharge Instructions:    There are NO dietary or activity restrictions.  You may resume your normal schedule.      You may have mild discomfort for a few hours after your testing today.  There may be some mild burning when you urinate or you may see some blood in your urine.  These problems should not last more than 24 hours.  The following suggestions may minimize any symptoms you experience.    Drink 6-8 large glasses of water over the next 8 hours  A compress or sitz bath may be soothing  Tylenol or Ibuprofen may be taken as needed    If you experience any of the following, please call the office or, if after hours, the on-call physician at [phone].    Excessive pain  Bright red bloody discharge  Fever or chills  Continued urgency, frequency or burning with urination    Obtaining Test Results    Your urodynamic test will be interpreted by a specialist and available to the referring physician within 7-14 days.  Patients in our clinic are given an appointment to come back to discuss the results and any appropriate treatment recommendations.    Please do not hesitate to contact our office with any questions or concerns at [phone].    I acknowledge that I have received verbal and written discharge  instructions and that I understand these instructions clearly.    Patient Signature:    Date:    [facility]    HAVING A URINARY CATHETER AFTER SURGERY    What is a urinary catheter?  A catheter is a soft tube used to drain urine from your bladder.    Why do I need a catheter?  A urinary catheter is used to help with healing immediately after surgery.  It works to keep your bladder empty while you are recovering.  Surgery, swelling and anesthesia can cause the bladder to lose its normal sensations  for a while.  The catheter may stay in place for a week or more after you go home.    Where will I go to get the catheter removed?  Your catheter will be removed in the office.  Please call the office to schedule a voiding trial with the nurse.    How will the catheter be removed?  A nurse will disconnect your catheter from the drainage bag.  She will attach a syringe to the catheter and fill the bladder with sterile water until you have a strong desire to urinate.  The catheter will be removed and you will be able to go to the bathroom to empty your bladder.    Will the catheter need to be replaced?  You will need to urinate a specific amount, depending on how much you were initially able to hold.  Your catheter will only need to be replaced if you are not able to empty the specific amount.  If the catheter is replaced, your nurse will discuss with you when you need to return.    What do I do after the catheter is removed?  For the first 24 hours, you should go to the bathroom with your first urge or every 2-3 hours while you are awake.  Urinate before you go to bed and if you wake up in the night, you do not need to set an alarm to wake up.  Drink plenty of water (6-8 glasses) slowly throughout the day.  Avoid liquids containing caffeine.  Continue with any pain medications (Motrin) as directed by the nurse.  Continue with your current bowel regime; avoid constipation.    What if I have trouble emptying my bladder?  If you are having trouble emptying your bladder, try the following tips:  Urinate normally then stand up, walk around for a minute or two, sit back down on the commode and attempt to urinate (double void).  Sit in a tub of warm water and try to urinate in the tub.  Run warm water over your vaginal area while attempting to urinate.    When should I call the office?  If you are unable to urinate for 6 hours.  You feel you need to urinate but cannot.  Urinating frequently in small amounts.  You experience  abdominal bloating, pressure or back pain.  You have a fever over 100.5 for 4 hours or above 101.0 anytime.  You have nausea and/or vomiting.  Burning/pain with urination.    Please feel free to call the nurse at [phone] with any questions 8am-4:30pm Monday-Friday.    If the office is closed, you may call the on call physician at [phone] or go to the emergency room.[facility]     Post-Operative Guidelines    AVOID CONSTIPATION - Take Miralax: one capful in water or juice each morning.  You can also take each evening if needed.  No lifting over 10 lbs. (1 gallon of milk is 8 lbs.)  It is okay to walk up and down stairs and to walk outside, but be careful not to become fatigued.  Showers and tub baths are okay, even if you have a catheter or abdominal incision.  You may ride in a car immediately.  You may drive after 1-2 weeks.    Please call us for any of the following:  Temperature above 100.5 for 4 hours or above 101.0 at any time  Chest pain or trouble breathing  Vaginal bleeding heavier than a period  Redness, tenderness or swelling of your legs  Pain or burning when you urinate  Redness, pain or foul discharge from incision          [facility]    BOWEL REGIMEN    Constipation can have detrimental effects on bladder function and can worsen the symptoms of prolapse.  It is important to avoid constipation.    The first step for treating constipation is to increase the amount of fiber in your diet.  It is usually difficult to get enough fiber each day in the food we eat.  Therefore, the best way to increase fiber is to take a daily fiber supplement.      _____Fiber Supplement  (Metamucil, Citrucel, Benefiber, etc)    Begin with 1 dose (as instructed on the package) every morning.    If the stool is too hard, or if the stool consists of small, hard, marbles, you may need to increase to 1 dose each morning and 1 dose each  evening.    If you find it difficult to take the fiber as directed (i.e. mixed with water or juice), you can mix the fiber with your cereal or with applesauce.    Don’t worry if you have to increase the amount---fiber is not addictive and will not cause diarrhea.      _____Milk of Magnesia    Begin with 1 teaspoon every evening.  This is a very low dose---approximately one-sixth of the typical dose.  You may need to increase the amount depending on your results.      _____Miralax    Miralax is a laxative available over the counter.  It can be used on a long-term basis if necessary.  The usual starting dose is 1 capful of powder mixed in fluid each morning.  The dose can be adjusted up or down depending on results and consistency of stool.

## 2025-07-17 NOTE — PROGRESS NOTES
Patient here for urodynamic testing.  Procedure explained and confirmed by patient.  See evaluation form for results.  Both verbal and written discharge instructions were given.  Patient tolerated procedure well and will follow up with Dr. Osiris Shah on  @11 am.    URODYNAMIC EVALUATION    PATIENT HISTORY:    Prolapse:  No  ALTAF:  No  UUI:  Yes  Nocturia: 8 x   Frequency:  less than 1 hour  Sense of Incomplete Emptying:  Yes  Constipation:  No  Last void prior to UDS testin minutes   Current urge to void?  None  OAB meds stopped prior to test?  NA  Other symptoms?      Surgery?  [x]  No  Pt open to all options at time of UDS testing.     Surgical folder provided?  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  []  Yes  [x]  No  []  Pessary  []  Manual  []  Half Speculum    Additional Notes:    UROFLOWMETRY:  Unreduced  Voided Volume:                18             mL  Maximum Flow Rate:                      2          mL/sec  Average flow rate:                1             mL/sec  Post-void Residual:              5           mL  Pattern:  []  Normal  [x]  Poor flow     []  Intermittent  []  Other  Void:   [x]  Typical  []  Atypical    Additional Notes:    CYSTOMETRY:  Urethral Catheter:  Fr 7 / tdoc  Abd Catheter:     Fr 7 / tdoc   Infusion:  Water Rate 30 mL/min  Temp:  Room  Position:  [x]  Sit  []  Stand  []  Supine  First sensation:   34 mL  First desire to void:   76 mL  Strong desire to  void:  177 mL  Maximum cystometric capacity:   300 mL  Detrusor Activity:  [x]  Unstable   []  Stable  Urge leakage?    [x]  Yes []  No  Volume at 1st unhibited detrusor cont:   35 mL  Detrusor instability provoked by:    [x]  Spontaneous []  Coughing  [x]  Filling  []  Valsalva  []  Other    Additional Notes:  Uninhibited contractions and large leaks noted throughout UDS testing.     URETHRAL FUNCTION:  Valsava (vesical) Leak Point Pressures:    Volume Leak Point Pressure Leak?    Cough Valsalva      100mL 159 118    cm H2O []  Yes [x]  No   200mL 178 126    cm H2O []  Yes [x]  No   300mL 168 124    cm H2O []  Yes [x]  No   REDUCED:      Genuine Stress Incontinence demonstrated?   []  Yes  [x]  No    Resting Urethral Pressure Profile:     Functional Urethral Length:         0.5 cm       0.4  cm     Maximum UCP:          28 cm          33   cm       PRESSURE/FLOW STUDY:  Unreduced  Voided volume:    37    mL  Maximum flow rate:      3  mL/sec  Pressure Detrusor (at maximum flow):       53     cm H2O  Post void residual:        6       mL  Voiding mechanism:  []  Abnormal  [x]  Normal  []  Strain to void   []  Weak detrusor  Void:   [x]  Typical   []  Atypical    Additional Notes: Uninhibited contractions and large leaks noted throughout UDS testing.   Uroflowmetry, cystometry, and pressure / flow study were completed using calibrated electronic equipment and air charged transducers.    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 90 tablet 0    estradiol (ESTRACE) 0.1 MG/GM Vaginal Cream Apply 1/2 gram vaginally 2 times per week. 42 g 3    Cobalamin Combinations (VITAMIN B12-FOLIC ACID) 500-400 MCG Oral Tab vitamin B12 500 mcg-folic acid 400 mcg tablet, [RxNorm: 306716]      folic acid 1 MG Oral Tab folic acid 1 mg tablet, [RxNorm: 513240]      Potassium Chloride ER 10 MEQ Oral Tab CR Take 2 tablets (20 mEq total) by mouth daily. 30 tablet 2    pantoprazole 40 MG Oral Tab EC Take 1 tablet (40 mg total) by mouth before breakfast. 90 tablet 3    aspirin 81 MG Oral Tab EC Take 1 tablet (81 mg total) by mouth in the morning.

## 2025-07-23 ENCOUNTER — APPOINTMENT (OUTPATIENT)
Dept: CT IMAGING | Facility: HOSPITAL | Age: 89
End: 2025-07-23
Attending: INTERNAL MEDICINE

## 2025-07-23 ENCOUNTER — OFFICE VISIT (OUTPATIENT)
Dept: UROLOGY | Facility: HOSPITAL | Age: 89
End: 2025-07-23
Attending: OBSTETRICS & GYNECOLOGY
Payer: COMMERCIAL

## 2025-07-23 VITALS
WEIGHT: 166 LBS | HEIGHT: 61 IN | SYSTOLIC BLOOD PRESSURE: 140 MMHG | BODY MASS INDEX: 31.34 KG/M2 | DIASTOLIC BLOOD PRESSURE: 75 MMHG

## 2025-07-23 DIAGNOSIS — R35.0 URINARY FREQUENCY: ICD-10-CM

## 2025-07-23 DIAGNOSIS — R30.0 DYSURIA: Primary | ICD-10-CM

## 2025-07-23 DIAGNOSIS — R35.1 NOCTURIA: ICD-10-CM

## 2025-07-23 DIAGNOSIS — N32.81 DETRUSOR INSTABILITY: ICD-10-CM

## 2025-07-23 LAB
CONTROL RUN WITHIN 24 HOURS?: YES
NITRITE URINE: NEGATIVE

## 2025-07-23 PROCEDURE — 87086 URINE CULTURE/COLONY COUNT: CPT | Performed by: OBSTETRICS & GYNECOLOGY

## 2025-07-23 PROCEDURE — 87186 SC STD MICRODIL/AGAR DIL: CPT | Performed by: OBSTETRICS & GYNECOLOGY

## 2025-07-23 PROCEDURE — 81002 URINALYSIS NONAUTO W/O SCOPE: CPT | Performed by: OBSTETRICS & GYNECOLOGY

## 2025-07-23 PROCEDURE — 87077 CULTURE AEROBIC IDENTIFY: CPT | Performed by: OBSTETRICS & GYNECOLOGY

## 2025-07-23 PROCEDURE — 99212 OFFICE O/P EST SF 10 MIN: CPT

## 2025-07-23 PROCEDURE — 51701 INSERT BLADDER CATHETER: CPT | Performed by: OBSTETRICS & GYNECOLOGY

## 2025-07-23 RX ORDER — NITROFURANTOIN 25; 75 MG/1; MG/1
100 CAPSULE ORAL 2 TIMES DAILY
Qty: 14 CAPSULE | Refills: 0 | Status: SHIPPED | OUTPATIENT
Start: 2025-07-23 | End: 2025-07-25 | Stop reason: ALTCHOICE

## 2025-07-23 RX ORDER — VIBEGRON 75 MG/1
1 TABLET, FILM COATED ORAL DAILY
Qty: 30 TABLET | Refills: 11 | Status: SHIPPED | OUTPATIENT
Start: 2025-07-23 | End: 2025-08-22

## 2025-07-23 NOTE — PROGRESS NOTES
Pt presents w/ initial c/o UI  Urodynamic testing undergone without complication.  Results reviewed with patient  18/5cc & 37/6cc  long term 300cc  DO @ 35cc  No ALTAF     Discussed with patient mgmt options for DO/UUI  Biggest bother is nocturia  Undecided about options - not keen on meds, botox, or PTNS  Wants instant relief  Worried about weight loss, blames nocturia    Discussed dietary and behavioral modifications for mgmt of urinary symptoms  Discussed weight management and benefits of weight loss on urinary symptoms  Reviewed AUA/SUFU guidelines on mgmt of non-neurogenic OAB  Discussed pharmacologic and nonpharmacologic mgmt options of urinary symptoms - reviewed risks, benefits, alternatives, and goals of treatment  Discussed specific risks related to OAB meds including, but not limited to dry mouth, constipation, blurry vision, cognitive changes, and BP elevation.      All questions answered.  Pt will proceed gemtesa 75mg daily    Follow up med check in 6 wks, sooner prn  Dr. Osiris Shah    Patient c/o dysuria, worried about UTI    /75 (BP Location: Left arm)   Ht 61\"   Wt 166 lb (75.3 kg)   BMI 31.37 kg/m²     GEN: NAD  CV: RRR  Pulm: nl effort  Abd: soft    PELVIC EXAM:  Ext. Gen: +atrophy, no lesions  Urethra: +atrophy, nontender  Bladder:+fullness, nontender  Vagina: +atrophy  Cervix & Uterus: absent  Adnexa:no masses, nontender  Perineum: nontender  Anus: defer  Rectum: defer  PELVIS FLOOR NEUROMUSCULAR FUNCTION:  Strength:  1 and Unable to hold greater than 3 sec  Perineal Sensation:  Normal        PELVIC SUPPORT:  [place]:  0  Ant:  0  Post:  1  CST:  negative  UVJ: somewhat hypermobile     Verbal consent obtained  Sterile technique used to empty bladder, specimen sent    Impression/Plan:    ICD-10-CM    1. Dysuria  R30.0 POCT urinalysis dipstick[26855]     Urine Culture, Routine      2. Urinary frequency  R35.0 Vibegron (GEMTESA) 75 MG Oral Tab      3. Nocturia  R35.1 Vibegron (GEMTESA) 75 MG Oral  Tab      4. 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 However, be advised this is a medical document. It is intended as peer to peer communication. It is written in medical language and may contain abbreviations or verbiage that are unfamiliar. It may appear blunt or direct. Medical documents are intended to carry relevant information, facts as evident, and the clinical opinion of the practitioner.

## 2025-07-25 ENCOUNTER — TELEPHONE (OUTPATIENT)
Dept: UROLOGY | Facility: HOSPITAL | Age: 89
End: 2025-07-25

## 2025-07-25 DIAGNOSIS — N39.0 ACUTE UTI: Primary | ICD-10-CM

## 2025-07-25 RX ORDER — SULFAMETHOXAZOLE AND TRIMETHOPRIM 800; 160 MG/1; MG/1
1 TABLET ORAL 2 TIMES DAILY
Qty: 14 TABLET | Refills: 0 | Status: SHIPPED | OUTPATIENT
Start: 2025-07-25 | End: 2025-08-01

## 2025-07-25 NOTE — TELEPHONE ENCOUNTER
Telephone call to patient with test results    Ucx + for >100,000 CFU/ML Proteus mirabilis     Frequent UTI's  No  6/27/25 No growth    5/21/25 <10k Mult species - prob contamination    Currently on suppression? No    Allergies reviewed    Discussed with Doreen Viveros PA-C, DEAN Stop NTF d/t resistance. Start Bactrim DS po bid x 7d    Will send antibiotics to pt's preferred pharmacy    Encouraged PO hydration, AZO prn for pain    Encouraged to avoid bladder irritants such as caffeine, alcohol, or carbonation    Discussed Gemtesa - will cost $198/month, not affordable for pt.   Phone number to [employer] Program provided.  Encouraged to call if medication still not affordable.    All questions answered     Follow up as scheduled    Patient understands and agrees to plan

## 2025-08-20 ENCOUNTER — HOSPITAL ENCOUNTER (OUTPATIENT)
Dept: CT IMAGING | Facility: HOSPITAL | Age: 89
Discharge: HOME | End: 2025-08-20
Attending: INTERNAL MEDICINE

## 2025-08-20 DIAGNOSIS — R63.4 WEIGHT LOSS: ICD-10-CM

## 2025-08-20 PROCEDURE — 74176 CT ABD & PELVIS W/O CONTRAST: CPT | Performed by: INTERNAL MEDICINE

## 2025-08-20 PROCEDURE — 71250 CT THORAX DX C-: CPT | Performed by: INTERNAL MEDICINE

## 2025-08-25 ENCOUNTER — RESULTS FOLLOW-UP (OUTPATIENT)
Dept: INTERNAL MEDICINE CLINIC | Facility: CLINIC | Age: 89
End: 2025-08-25

## (undated) NOTE — LETTER
Aliza Pacheco Md  [address] [place] [id number]       06/27/24        Patient: Ivis Pablo   YOB: 1935   Date of Visit: 6/27/2024       Dear  Dr. Tara MD,      Thank you for referring Ivis Pablo to my practice.  Please find my assessment and plan below.        ASSESSMENT AND PLAN    1. Oral mucosal lesion  Unclear which she may be seen at this time.  Unlikely to be thrush as she never had any resolution with previous treatments.  We discussed the possibility that this may be some type of pemphigus or pemphigoid type of process.  I did ask her to start a prednisone burst and taper a mouthwash containing Benadryl and hydrocortisone as well as's for secondary mucosal infection from oral bacteria.  Return to see me in 1 week for reevaluation.  I did discuss with her the fact that this is serious enough that she has has not eaten in about 3 days and if she finds that she cannot take her medications or food or more importantly taken liquids that she should present to the ED for possible admission.  She states understanding and agrees with this plan.           Sincerely,   Leon Figueredo MD   [facility], [address] [address]    Document electronically generated by:  Leon Figueredo MD

## (undated) NOTE — LETTER
[facility] - [facility] UROGYNECOLOGY  [address]  [facility] [address]  PH: [phone]  FAX: [phone]   Consent to Procedure/Sedation    Date: __7/17/2025_____    Time: ___8:02 AM ___    1. I authorize the performance upon Ivis Pablo the following:  Urodynamics (UDS)      2. I authorize Dr. Osiris Shah (and whomever is designated as the doctor’s assistant), to perform the above mentioned procedures.    3. If any unforeseen conditions arise during this procedure calling for additional procedures, operations, or medications (including anesthesia and blood transfusion), I  further request and authorize the doctor to do whatever he/she deems advisable in my interest.    4. I consent to the taking and reproduction of any photographs in the course of this procedure for professional purposes.    5. I consent to the administration of such sedation as may be considered necessary or advisable by the physician responsible for this service, with the exception of  _____________________________.    6. I have been informed by my doctor of the nature and purpose of this procedure/sedation, possible alternative methods of treatment, risk involved and possible complications.    7. If I have a Do Not Resuscitate (DNR) order in place, the physician and I (or the  individual authorized to consent on my behalf) will discuss and agree as to whether the  Do Not Resuscitate (DNR) order will remain in effect or will be discontinued during the  performance of the procedure and the applicable recovery period. If the Do Not  Resuscitate (DNR) order is discontinued and is to be reinstated following the  procedure/recovery period, the physician will determine when the applicable recovery  period ends for purposes of reinstating the Do Not Resuscitate (DNR) order.    8. In the event your procedure results in extended X-Ray/Fluoroscopy time, you may develop a skin reaction.    Signature of  Patient:  ___________________________    Signature of person authorized to consent for patient: Relationship to patient:  ___________________________    ___________________    Witness: ____________________     Date: ______________    Printed: 2025   8:02 AM    Patient Name: Ivis DAMIAN Samy        : 1935       Medical Record #: [id number]

## (undated) NOTE — Clinical Note
Aliza - I saw Ivis today with nocturia & urinary frequency. I've recommended bladder testing. I will work to manage her urinary sx. I appreciate the opportunity to participate in her care. Thanks, Osiris